# Patient Record
Sex: MALE | Race: WHITE | Employment: UNEMPLOYED | URBAN - METROPOLITAN AREA
[De-identification: names, ages, dates, MRNs, and addresses within clinical notes are randomized per-mention and may not be internally consistent; named-entity substitution may affect disease eponyms.]

---

## 2017-06-06 ENCOUNTER — OFFICE VISIT (OUTPATIENT)
Dept: FAMILY MEDICINE | Facility: CLINIC | Age: 22
End: 2017-06-06
Payer: COMMERCIAL

## 2017-06-06 VITALS
HEART RATE: 76 BPM | SYSTOLIC BLOOD PRESSURE: 122 MMHG | TEMPERATURE: 97.7 F | BODY MASS INDEX: 27.92 KG/M2 | DIASTOLIC BLOOD PRESSURE: 72 MMHG | WEIGHT: 195 LBS | HEIGHT: 70 IN

## 2017-06-06 DIAGNOSIS — Z00.00 ROUTINE HISTORY AND PHYSICAL EXAMINATION OF ADULT: Primary | ICD-10-CM

## 2017-06-06 LAB
ALBUMIN SERPL-MCNC: 4 G/DL (ref 3.4–5)
ALP SERPL-CCNC: 79 U/L (ref 40–150)
ALT SERPL W P-5'-P-CCNC: 27 U/L (ref 0–70)
ANION GAP SERPL CALCULATED.3IONS-SCNC: 6 MMOL/L (ref 3–14)
AST SERPL W P-5'-P-CCNC: 21 U/L (ref 0–45)
BASOPHILS # BLD AUTO: 0 10E9/L (ref 0–0.2)
BASOPHILS NFR BLD AUTO: 0.2 %
BILIRUB SERPL-MCNC: 0.5 MG/DL (ref 0.2–1.3)
BUN SERPL-MCNC: 11 MG/DL (ref 7–30)
CALCIUM SERPL-MCNC: 8.9 MG/DL (ref 8.5–10.1)
CHLORIDE SERPL-SCNC: 106 MMOL/L (ref 94–109)
CO2 SERPL-SCNC: 28 MMOL/L (ref 20–32)
CREAT SERPL-MCNC: 0.76 MG/DL (ref 0.66–1.25)
DIFFERENTIAL METHOD BLD: NORMAL
EOSINOPHIL # BLD AUTO: 0.2 10E9/L (ref 0–0.7)
EOSINOPHIL NFR BLD AUTO: 1.7 %
ERYTHROCYTE [DISTWIDTH] IN BLOOD BY AUTOMATED COUNT: 13.6 % (ref 10–15)
GFR SERPL CREATININE-BSD FRML MDRD: ABNORMAL ML/MIN/1.7M2
GLUCOSE SERPL-MCNC: 100 MG/DL (ref 70–99)
HCT VFR BLD AUTO: 46 % (ref 40–53)
HGB BLD-MCNC: 16.1 G/DL (ref 13.3–17.7)
LYMPHOCYTES # BLD AUTO: 1.6 10E9/L (ref 0.8–5.3)
LYMPHOCYTES NFR BLD AUTO: 16 %
MCH RBC QN AUTO: 27.9 PG (ref 26.5–33)
MCHC RBC AUTO-ENTMCNC: 35 G/DL (ref 31.5–36.5)
MCV RBC AUTO: 80 FL (ref 78–100)
MONOCYTES # BLD AUTO: 1 10E9/L (ref 0–1.3)
MONOCYTES NFR BLD AUTO: 9.7 %
NEUTROPHILS # BLD AUTO: 7.3 10E9/L (ref 1.6–8.3)
NEUTROPHILS NFR BLD AUTO: 72.4 %
PLATELET # BLD AUTO: 187 10E9/L (ref 150–450)
POTASSIUM SERPL-SCNC: 4.3 MMOL/L (ref 3.4–5.3)
PROT SERPL-MCNC: 7.8 G/DL (ref 6.8–8.8)
RBC # BLD AUTO: 5.77 10E12/L (ref 4.4–5.9)
SODIUM SERPL-SCNC: 140 MMOL/L (ref 133–144)
WBC # BLD AUTO: 10.1 10E9/L (ref 4–11)

## 2017-06-06 PROCEDURE — 80053 COMPREHEN METABOLIC PANEL: CPT | Performed by: PHYSICIAN ASSISTANT

## 2017-06-06 PROCEDURE — 36415 COLL VENOUS BLD VENIPUNCTURE: CPT | Performed by: PHYSICIAN ASSISTANT

## 2017-06-06 PROCEDURE — 85025 COMPLETE CBC W/AUTO DIFF WBC: CPT | Performed by: PHYSICIAN ASSISTANT

## 2017-06-06 PROCEDURE — 99395 PREV VISIT EST AGE 18-39: CPT | Performed by: PHYSICIAN ASSISTANT

## 2017-06-06 ASSESSMENT — ANXIETY QUESTIONNAIRES
2. NOT BEING ABLE TO STOP OR CONTROL WORRYING: NOT AT ALL
GAD7 TOTAL SCORE: 1
7. FEELING AFRAID AS IF SOMETHING AWFUL MIGHT HAPPEN: NOT AT ALL
5. BEING SO RESTLESS THAT IT IS HARD TO SIT STILL: SEVERAL DAYS
6. BECOMING EASILY ANNOYED OR IRRITABLE: NOT AT ALL
3. WORRYING TOO MUCH ABOUT DIFFERENT THINGS: NOT AT ALL
1. FEELING NERVOUS, ANXIOUS, OR ON EDGE: NOT AT ALL

## 2017-06-06 ASSESSMENT — PATIENT HEALTH QUESTIONNAIRE - PHQ9: 5. POOR APPETITE OR OVEREATING: NOT AT ALL

## 2017-06-06 NOTE — NURSING NOTE
"Chief Complaint   Patient presents with     Physical       Initial /72 (BP Location: Right arm, Patient Position: Chair, Cuff Size: Adult Large)  Pulse 76  Temp 97.7  F (36.5  C) (Tympanic)  Ht 5' 10\" (1.778 m)  Wt 195 lb (88.5 kg)  BMI 27.98 kg/m2 Estimated body mass index is 27.98 kg/(m^2) as calculated from the following:    Height as of this encounter: 5' 10\" (1.778 m).    Weight as of this encounter: 195 lb (88.5 kg).  Medication Reconciliation: complete    Health Maintenance that is potentially due pending provider review:  NONE    Edna GALDAMEZ MA        "

## 2017-06-06 NOTE — MR AVS SNAPSHOT
After Visit Summary   6/6/2017    James Webb    MRN: [id number]           Patient Information     Date Of Birth          1995        Visit Information        Provider Department      6/6/2017 11:20 AM Grady Ayala PA-C [facility]        Today's Diagnoses     Routine history and physical examination of adult    -  1      Care Instructions      Preventive Health Recommendations  Male Ages 18 - 25     Yearly exam:             See your health care provider every year in order to  o   Review health changes.   o   Discuss preventive care.    o   Review your medicines if your doctor has prescribed any.    You should be tested each year for STDs (sexually transmitted diseases).     Talk to your provider about cholesterol testing.      If you are at risk for diabetes, you should have a diabetes test (fasting glucose).    Shots: Get a flu shot each year. Get a tetanus shot every 10 years.     Nutrition:    Eat at least 5 servings of fruits and vegetables daily.     Eat whole-grain bread, whole-wheat pasta and brown rice instead of white grains and rice.     Talk to your provider about calcium and Vitamin D.     Lifestyle    Exercise for at least 150 minutes a week (30 minutes a day, 5 days a week). This will help you control your weight and prevent disease.     Limit alcohol to one drink per day.     No smoking.     Wear sunscreen to prevent skin cancer.     See your dentist every six months for an exam and cleaning.             Follow-ups after your visit        Who to contact     If you have questions or need follow up information about today's clinic visit or your schedule please contact [facility] directly at [phone].  Normal or non-critical lab and imaging results will be communicated to you by MyChart, letter or phone within 4 business days after the clinic has received the results. If you do not hear from us within 7 days, please contact the clinic  "through [employer] or phone. If you have a critical or abnormal lab result, we will notify you by phone as soon as possible.  Submit refill requests through [employer] or call your pharmacy and they will forward the refill request to us. Please allow 3 business days for your refill to be completed.          Additional Information About Your Visit        [employer] Information     [employer] lets you send messages to your doctor, view your test results, renew your prescriptions, schedule appointments and more. To sign up, go to [email or web address] . Click on \"Log in\" on the left side of the screen, which will take you to the Welcome page. Then click on \"Sign up Now\" on the right side of the page.     You will be asked to enter the access code listed below, as well as some personal information. Please follow the directions to create your username and password.     Your access code is: [email or web address]  Expires: 2017 11:40 AM     Your access code will  in 90 days. If you need help or a new code, please call your [facility] clinic or [phone].        Care EveryWhere ID     This is your Care EveryWhere ID. This could be used by other organizations to access your [facility] medical records  [id number]        Your Vitals Were     Pulse Temperature Height BMI (Body Mass Index)          76 97.7  F (36.5  C) (Tympanic) 5' 10\" (1.778 m) 27.98 kg/m2         Blood Pressure from Last 3 Encounters:   17 122/72   10/26/16 114/64   10/25/16 122/72    Weight from Last 3 Encounters:   17 195 lb (88.5 kg)   10/25/16 188 lb 11.4 oz (85.6 kg)   10/25/16 192 lb (87.1 kg)              Today, you had the following     No orders found for display         Today's Medication Changes          These changes are accurate as of: 17 11:40 AM.  If you have any questions, ask your nurse or doctor.               Stop taking these medicines if you haven't already. Please contact your care team if you have questions.     dexamethasone 4 " MG tablet   Commonly known as:  DECADRON   Stopped by:  Grady Ayala, JACQUELYN                    Primary Care Provider Office Phone # Fax #    Layla Webb [phone]       [place] [address] [address] [address]        Thank you!     Thank you for choosing [facility]  for your care. Our goal is always to provide you with excellent care. Hearing back from our patients is one way we can continue to improve our services. Please take a few minutes to complete the written survey that you may receive in the mail after your visit with us. Thank you!             Your Updated Medication List - Protect others around you: Learn how to safely use, store and throw away your medicines at www.disposemymeds.org.          This list is accurate as of: 6/6/17 11:40 AM.  Always use your most recent med list.                   Brand Name Dispense Instructions for use    albuterol 108 (90 BASE) MCG/ACT Inhaler    PROAIR HFA/PROVENTIL HFA/VENTOLIN HFA    1 Inhaler    Inhale 2 puffs into the lungs every 6 hours       nicotine 21 MG/24HR 24 hr patch    NICODERM CQ    30 patch    Place 1 patch onto the skin every 24 hours       nicotine polacrilex 2 MG gum    [employer] NICOTINE POLACRILEX    30 tablet    Place 1 each (2 mg) inside cheek as needed for smoking cessation

## 2017-06-06 NOTE — PROGRESS NOTES
SUBJECTIVE:     CC: James Webb is an 22 year old male who presents for preventative health visit.     Healthy Habits:    Do you get at least three servings of calcium containing foods daily (dairy, green leafy vegetables, etc.)? yes    Amount of exercise or daily activities, outside of work: 5-6 day(s) per week    Problems taking medications regularly No    Medication side effects: No    Have you had an eye exam in the past two years? no    Do you see a dentist twice per year? yes    Do you have sleep apnea, excessive snoring or daytime drowsiness?no            Today's PHQ-2 Score:   PHQ-2 ( 1999 Pfizer) 1/15/2015   Q1: Little interest or pleasure in doing things 0   Q2: Feeling down, depressed or hopeless 0   PHQ-2 Score 0       Abuse: Current or Past(Physical, Sexual or Emotional)- No  Do you feel safe in your environment - Yes    Social History   Substance Use Topics     Smoking status: Current Every Day Smoker     Packs/day: 0.50     Years: 5.00     Types: Cigarettes     Smokeless tobacco: Never Used      Comment: not exposed to 2nd hand smoke     Alcohol use No     The patient does not drink >3 drinks per day nor >7 drinks per week.    Last PSA: No results found for: PSA    No results for input(s): CHOL, HDL, LDL, TRIG, CHOLHDLRATIO, NHDL in the last 92797 hours.    Reviewed orders with patient. Reviewed health maintenance and updated orders accordingly - Yes    Reviewed and updated as needed this visit by clinical staff  Tobacco  Allergies  Med Hx  Surg Hx  Fam Hx  Soc Hx        Reviewed and updated as needed this visit by Provider        History reviewed. No pertinent past medical history.   Past Surgical History:   Procedure Laterality Date     SURGICAL HISTORY OF -   07/1999    PE Tubes       ROS:  C: NEGATIVE for fever, chills, change in weight  I: NEGATIVE for worrisome rashes, moles or lesions  E: NEGATIVE for vision changes or irritation  ENT: NEGATIVE for ear, mouth and throat  "problems  R: NEGATIVE for significant cough or SOB  B: NEGATIVE for masses, tenderness or discharge  CV: NEGATIVE for chest pain, palpitations or peripheral edema  GI: NEGATIVE for nausea, abdominal pain, heartburn, or change in bowel habits   male: negative for dysuria, hematuria, decreased urinary stream, erectile dysfunction, urethral discharge  M: NEGATIVE for significant arthralgias or myalgia  N: NEGATIVE for weakness, dizziness or paresthesias  P: NEGATIVE for changes in mood or affect    Problem list, Medication list, Allergies, and Medical/Social/Surgical histories reviewed in [facility] and updated as appropriate.  Labs reviewed in EPIC  BP Readings from Last 3 Encounters:   06/06/17 122/72   10/26/16 114/64   10/25/16 122/72    Wt Readings from Last 3 Encounters:   06/06/17 195 lb (88.5 kg)   10/25/16 188 lb 11.4 oz (85.6 kg)   10/25/16 192 lb (87.1 kg)                  Recent Labs   Lab Test  10/26/16   0610  10/25/16   1240  10/25/16   0925   ALT   --    --   31   CR  0.63*  0.70   --    GFRESTIMATED  >90  Non  GFR Calc    >90  Non  GFR Calc     --    GFRESTBLACK  >90   GFR Calc    >90   GFR Calc     --    POTASSIUM  4.6  3.9   --       OBJECTIVE:     /72 (BP Location: Right arm, Patient Position: Chair, Cuff Size: Adult Large)  Pulse 76  Temp 97.7  F (36.5  C) (Tympanic)  Ht 5' 10\" (1.778 m)  Wt 195 lb (88.5 kg)  BMI 27.98 kg/m2  EXAM:  GENERAL: healthy, alert and no distress  EYES: Eyes grossly normal to inspection, PERRL and conjunctivae and sclerae normal  HENT: ear canals and TM's normal, nose and mouth without ulcers or lesions  NECK: no adenopathy, no asymmetry, masses, or scars and thyroid normal to palpation  RESP: lungs clear to auscultation - no rales, rhonchi or wheezes  BREAST: normal without masses, tenderness or nipple discharge and no palpable axillary masses or adenopathy  CV: regular rate and rhythm, normal S1 S2, " "no S3 or S4, no murmur, click or rub, no peripheral edema and peripheral pulses strong  ABDOMEN: soft, nontender, no hepatosplenomegaly, no masses and bowel sounds normal  MS: no gross musculoskeletal defects noted, no edema  SKIN: no suspicious lesions or rashes  NEURO: Normal strength and tone, mentation intact and speech normal  PSYCH: mentation appears normal, affect normal/bright  LYMPH: no cervical, supraclavicular, axillary, or inguinal adenopathy    ASSESSMENT/PLAN:         ICD-10-CM    1. Routine history and physical examination of adult Z00.00 Comprehensive metabolic panel (BMP + Alb, Alk Phos, ALT, AST, Total. Bili, TP)     CBC with platelets and differential       COUNSELING:  Reviewed preventive health counseling, as reflected in patient instructions       Regular exercise       Healthy diet/nutrition         reports that he has been smoking Cigarettes.  He has a 2.50 pack-year smoking history. He has never used smokeless tobacco.    Estimated body mass index is 27.98 kg/(m^2) as calculated from the following:    Height as of this encounter: 5' 10\" (1.778 m).    Weight as of this encounter: 195 lb (88.5 kg).       Counseling Resources:  ATP IV Guidelines  Pooled Cohorts Equation Calculator  FRAX Risk Assessment  ICSI Preventive Guidelines  Dietary Guidelines for Americans, 2010  USDA's MyPlate  ASA Prophylaxis  Lung CA Screening    Grady Ayala PA-C  [facility]  "

## 2017-06-07 ASSESSMENT — PATIENT HEALTH QUESTIONNAIRE - PHQ9: SUM OF ALL RESPONSES TO PHQ QUESTIONS 1-9: 2

## 2017-06-07 ASSESSMENT — ANXIETY QUESTIONNAIRES: GAD7 TOTAL SCORE: 1

## 2017-06-07 ASSESSMENT — ASTHMA QUESTIONNAIRES: ACT_TOTALSCORE: 25

## 2017-12-20 ENCOUNTER — OFFICE VISIT (OUTPATIENT)
Dept: FAMILY MEDICINE | Facility: CLINIC | Age: 22
End: 2017-12-20
Payer: COMMERCIAL

## 2017-12-20 VITALS
WEIGHT: 180 LBS | TEMPERATURE: 98.8 F | HEIGHT: 70 IN | BODY MASS INDEX: 25.77 KG/M2 | DIASTOLIC BLOOD PRESSURE: 70 MMHG | SYSTOLIC BLOOD PRESSURE: 122 MMHG | HEART RATE: 90 BPM

## 2017-12-20 DIAGNOSIS — R07.0 THROAT PAIN: Primary | ICD-10-CM

## 2017-12-20 DIAGNOSIS — J02.0 STREP THROAT: ICD-10-CM

## 2017-12-20 LAB
DEPRECATED S PYO AG THROAT QL EIA: ABNORMAL
SPECIMEN SOURCE: ABNORMAL

## 2017-12-20 PROCEDURE — 87880 STREP A ASSAY W/OPTIC: CPT | Performed by: PHYSICIAN ASSISTANT

## 2017-12-20 PROCEDURE — 99213 OFFICE O/P EST LOW 20 MIN: CPT | Performed by: PHYSICIAN ASSISTANT

## 2017-12-20 ASSESSMENT — ENCOUNTER SYMPTOMS
EYE PAIN: 0
DEPRESSION: 0
WEAKNESS: 1
DYSURIA: 0
NAUSEA: 0
SORE THROAT: 1
HEADACHES: 1
COUGH: 1
FREQUENCY: 0
DIARRHEA: 0
FEVER: 1
BACK PAIN: 0
BLURRED VISION: 0
CHILLS: 1
CONSTIPATION: 0
ABDOMINAL PAIN: 0
PALPITATIONS: 0
DIZZINESS: 0

## 2017-12-20 NOTE — LETTER
My Asthma Action Plan  Name: James Webb   YOB: 1995  Date: 12/20/2017   My doctor: Grady Ayala PA-C   My clinic: [facility]        My Control Medicine: None  My Rescue Medicine: Albuterol (Proair/Ventolin/Proventil) inhaler .   My Asthma Severity: intermittent  Avoid your asthma triggers: Patient is unaware of triggers               GREEN ZONE   Good Control    I feel good    No cough or wheeze    Can work, sleep and play without asthma symptoms       Take your asthma control medicine every day.     1. If exercise triggers your asthma, take your rescue medication    15 minutes before exercise or sports, and    During exercise if you have asthma symptoms  2. Spacer to use with inhaler: If you have a spacer, make sure to use it with your inhaler             YELLOW ZONE Getting Worse  I have ANY of these:    I do not feel good    Cough or wheeze    Chest feels tight    Wake up at night   1. Keep taking your Green Zone medications  2. Start taking your rescue medicine:    every 20 minutes for up to 1 hour. Then every 4 hours for 24-48 hours.  3. If you stay in the Yellow Zone for more than 12-24 hours, contact your doctor.  4. If you do not return to the Green Zone in 12-24 hours or you get worse, start taking your oral steroid medicine if prescribed by your provider.           RED ZONE Medical Alert - Get Help  I have ANY of these:    I feel awful    Medicine is not helping    Breathing getting harder    Trouble walking or talking    Nose opens wide to breathe       1. Take your rescue medicine NOW  2. If your provider has prescribed an oral steroid medicine, start taking it NOW  3. Call your doctor NOW  4. If you are still in the Red Zone after 20 minutes and you have not reached your doctor:    Take your rescue medicine again and    Call 911 or go to the emergency room right away    See your regular doctor within 2 weeks of an Emergency Room or Urgent Care visit for  follow-up treatment.        Electronically signed by: Merry Chavez, December 20, 2017    Annual Reminders:  Meet with Asthma Educator,  Flu Shot in the Fall, consider Pneumonia Vaccination for patients with asthma (aged 19 and older).    Pharmacy:    [facility] [address] [facility] [address] [facility] [address] [facility] [address] [facility] [address] [facility] [address]                    Asthma Triggers  How To Control Things That Make Your Asthma Worse    Triggers are things that make your asthma worse.  Look at the list below to help you find your triggers and what you can do about them.  You can help prevent asthma flare-ups by staying away from your triggers.      Trigger                                                          What you can do   Cigarette Smoke  Tobacco smoke can make asthma worse. Do not allow smoking in your home, car or around you.  Be sure no one smokes at a child s day care or school.  If you smoke, ask your health care provider for ways to help you quit.  Ask family members to quit too.  Ask your health care provider for a referral to Quit Plan to help you quit smoking, or call 7-009-141-PLAN.     Colds, Flu, Bronchitis  These are common triggers of asthma. Wash your hands often.  Don t touch your eyes, nose or mouth.  Get a flu shot every year.     Dust Mites  These are tiny bugs that live in cloth or carpet. They are too small to see. Wash sheets and blankets in hot water every week.   Encase pillows and mattress in dust mite proof covers.  Avoid having carpet if you can. If you have carpet, vacuum weekly.   Use a dust mask and HEPA vacuum.   Pollen and Outdoor Mold  Some people are allergic to trees, grass, or weed pollen, or molds. Try to keep your windows closed.  Limit time out doors when pollen count is high.   Ask you health care provider about taking medicine during allergy season.     Animal Dander  Some people are allergic to skin flakes,  urine or saliva from pets with fur or feathers. Keep pets with fur or feathers out of your home.    If you can t keep the pet outdoors, then keep the pet out of your bedroom.  Keep the bedroom door closed.  Keep pets off cloth furniture and away from stuffed toys.     Mice, Rats, and Cockroaches  Some people are allergic to the waste from these pests.   Cover food and garbage.  Clean up spills and food crumbs.  Store grease in the refrigerator.   Keep food out of the bedroom.   Indoor Mold  This can be a trigger if your home has high moisture. Fix leaking faucets, pipes, or other sources of water.   Clean moldy surfaces.  Dehumidify basement if it is damp and smelly.   Smoke, Strong Odors, and Sprays  These can reduce air quality. Stay away from strong odors and sprays, such as perfume, powder, hair spray, paints, smoke incense, paint, cleaning products, candles and new carpet.   Exercise or Sports  Some people with asthma have this trigger. Be active!  Ask your doctor about taking medicine before sports or exercise to prevent symptoms.    Warm up for 5-10 minutes before and after sports or exercise.     Other Triggers of Asthma  Cold air:  Cover your nose and mouth with a scarf.  Sometimes laughing or crying can be a trigger.  Some medicines and food can trigger asthma.

## 2017-12-20 NOTE — PROGRESS NOTES
HPI      SUBJECTIVE:   James Webb is a 22 year old male who presents to clinic today for sore throat and fever for the last 3-4 days.    ENT Symptoms             Symptoms: cc Present Absent Comment   Fever/Chills  x     Fatigue  x     Muscle Aches   x    Eye Irritation   x    Sneezing   x    Nasal Jose/Drg   x    Sinus Pressure/Pain   x    Loss of smell   x    Dental pain   x    Sore Throat  x     Swollen Glands  x     Ear Pain/Fullness   x    Cough  x     Wheeze  x     Chest Pain   x    Shortness of breath  x     Rash   x    Other   x      Symptom duration:  3-4 days   Symptom severity:  moderate   Treatments tried:  none   Contacts:  None           Problem list and histories reviewed & adjusted, as indicated.  Additional history: as documented    Patient Active Problem List   Diagnosis     Attention deficit hyperactivity disorder (ADHD)     Depression/Anxiety     Mild intermittent asthma     Infectious mononucleosis     Mononucleosis     Past Surgical History:   Procedure Laterality Date     SURGICAL HISTORY OF -   07/1999    PE Tubes       Social History   Substance Use Topics     Smoking status: Current Every Day Smoker     Packs/day: 0.50     Years: 5.00     Types: Cigarettes     Smokeless tobacco: Never Used      Comment: not exposed to 2nd hand smoke     Alcohol use No     Family History   Problem Relation Age of Onset     Hypertension Maternal Grandmother      Cancer - colorectal Other      colon cancer      Respiratory Maternal Aunt      Thyroid Disease Maternal Aunt          Current Outpatient Prescriptions   Medication Sig Dispense Refill     amoxicillin-clavulanate (AUGMENTIN) 875-125 MG per tablet Take 1 tablet by mouth 2 times daily 20 tablet 0     albuterol (PROAIR HFA, PROVENTIL HFA, VENTOLIN HFA) 108 (90 BASE) MCG/ACT inhaler Inhale 2 puffs into the lungs every 6 hours 1 Inhaler 5     No Known Allergies  Labs reviewed in EPIC      Reviewed and updated as needed this visit by clinical  staff       Reviewed and updated as needed this visit by Provider     Review of Systems   Constitutional: Positive for chills, fever and malaise/fatigue.   HENT: Positive for sore throat. Negative for congestion, ear pain, hearing loss and nosebleeds.    Eyes: Negative for blurred vision and pain.   Respiratory: Positive for cough.    Cardiovascular: Negative for chest pain and palpitations.   Gastrointestinal: Negative for abdominal pain, constipation, diarrhea and nausea.   Genitourinary: Negative for dysuria and frequency.   Musculoskeletal: Negative for back pain and joint pain.   Skin: Negative for rash.   Neurological: Positive for weakness and headaches. Negative for dizziness.   Psychiatric/Behavioral: Negative for depression.         Physical Exam   Constitutional: He is oriented to person, place, and time and well-developed, well-nourished, and in no distress.   HENT:   Head: Normocephalic and atraumatic.   Right Ear: External ear normal.   Left Ear: External ear normal.   Nose: Nose normal.   Mouth/Throat: Oropharyngeal exudate, posterior oropharyngeal edema and posterior oropharyngeal erythema present.   Eyes: Conjunctivae and EOM are normal. Pupils are equal, round, and reactive to light. Right eye exhibits no discharge. Left eye exhibits no discharge. No scleral icterus.   Neck: Normal range of motion. Neck supple. No thyromegaly present.   Cardiovascular: Normal rate, regular rhythm, normal heart sounds and intact distal pulses.  Exam reveals no gallop and no friction rub.    No murmur heard.  Pulmonary/Chest: Effort normal and breath sounds normal. No respiratory distress. He has no wheezes. He has no rales. He exhibits no tenderness.   Abdominal: Soft. Bowel sounds are normal. He exhibits no distension and no mass. There is no tenderness. There is no rebound and no guarding.   Musculoskeletal: Normal range of motion. He exhibits no edema or tenderness.   Lymphadenopathy:     He has no cervical  adenopathy.   Neurological: He is alert and oriented to person, place, and time. He has normal reflexes. No cranial nerve deficit. He exhibits normal muscle tone. Gait normal. Coordination normal.   Skin: Skin is warm and dry. No rash noted. No erythema.   Psychiatric: Mood, memory, affect and judgment normal.       (R07.0) Throat pain  (primary encounter diagnosis)  Comment:   Plan: Rapid strep screen           (J02.0) Strep throat  Comment:   Plan: amoxicillin-clavulanate (AUGMENTIN) 875-125 MG         per tablet, DISCONTINUED:         amoxicillin-clavulanate (AUGMENTIN) 875-125 MG         per tablet          Strep screen was positive and because of the enlarged tonsils and exudative nature I have used Augmentin and follow-up if not improving

## 2017-12-20 NOTE — MR AVS SNAPSHOT
"              After Visit Summary   12/20/2017    James Webb    MRN: [id number]           Patient Information     Date Of Birth          1995        Visit Information        Provider Department      12/20/2017 10:40 AM Grady Ayala PA-C [facility]        Today's Diagnoses     Throat pain    -  1    Strep throat           Follow-ups after your visit        Follow-up notes from your care team     Return if symptoms worsen or fail to improve.      Who to contact     If you have questions or need follow up information about today's clinic visit or your schedule please contact [facility] directly at [phone].  Normal or non-critical lab and imaging results will be communicated to you by [employer], letter or phone within 4 business days after the clinic has received the results. If you do not hear from us within 7 days, please contact the clinic through [employer] or phone. If you have a critical or abnormal lab result, we will notify you by phone as soon as possible.  Submit refill requests through [employer] or call your pharmacy and they will forward the refill request to us. Please allow 3 business days for your refill to be completed.          Additional Information About Your Visit        MyChart Information     [employer] gives you secure access to your electronic health record. If you see a primary care provider, you can also send messages to your care team and make appointments. If you have questions, please call your primary care clinic.  If you do not have a primary care provider, please call [phone] and they will assist you.        Care EveryWhere ID     This is your Care EveryWhere ID. This could be used by other organizations to access your [place] medical records  [id number]        Your Vitals Were     Pulse Temperature Height BMI (Body Mass Index)          90 98.8  F (37.1  C) (Oral) 5' 10\" (1.778 m) 25.83 kg/m2         Blood Pressure from Last 3 " Encounters:   12/20/17 122/70   06/06/17 122/72   10/26/16 114/64    Weight from Last 3 Encounters:   12/20/17 180 lb (81.6 kg)   06/06/17 195 lb (88.5 kg)   10/25/16 188 lb 11.4 oz (85.6 kg)              We Performed the Following     Asthma Action Plan (AAP)     Rapid strep screen          Today's Medication Changes          These changes are accurate as of: 12/20/17  1:27 PM.  If you have any questions, ask your nurse or doctor.               Start taking these medicines.        Dose/Directions    amoxicillin-clavulanate 875-125 MG per tablet   Commonly known as:  AUGMENTIN   Used for:  Strep throat   Started by:  Grady Ayala PA-C        Dose:  1 tablet   Take 1 tablet by mouth 2 times daily   Quantity:  20 tablet   Refills:  0            Where to get your medicines      These medications were sent to [facility] [address] [place] [address] [address] [facility]  [address] [facility] [address]     Phone:  [phone]     amoxicillin-clavulanate 875-125 MG per tablet                Primary Care Provider Office Phone # Fax #    Layla Webb, [phone]       [address] [place] [address]        Equal Access to Services     KAUSHIK WEST AH: Hadii lynda de los santos hadasho Soomaali, waaxda luqadaha, qaybta kaalmada adeegyada, ericka jones. So [facility] [phone].    ATENCIÓN: Si habla español, tiene a douglas disposición servicios gratuitos de asistencia lingüística. Llame al [phone].    We comply with applicable federal civil rights laws and [place] laws. We do not discriminate on the basis of race, color, national origin, age, disability, sex, sexual orientation, or gender identity.            Thank you!     Thank you for choosing [facility]  for your care. Our goal is always to provide you with excellent care. Hearing back from our patients is one way we can continue to improve our services. Please take a few minutes to complete the  written survey that you may receive in the mail after your visit with us. Thank you!             Your Updated Medication List - Protect others around you: Learn how to safely use, store and throw away your medicines at www.disposemymeds.org.          This list is accurate as of: 12/20/17  1:28 PM.  Always use your most recent med list.                   Brand Name Dispense Instructions for use Diagnosis    albuterol 108 (90 BASE) MCG/ACT Inhaler    PROAIR HFA/PROVENTIL HFA/VENTOLIN HFA    1 Inhaler    Inhale 2 puffs into the lungs every 6 hours    Mild intermittent asthma       amoxicillin-clavulanate 875-125 MG per tablet    AUGMENTIN    20 tablet    Take 1 tablet by mouth 2 times daily    Strep throat

## 2017-12-21 ASSESSMENT — ASTHMA QUESTIONNAIRES: ACT_TOTALSCORE: 24

## 2018-03-06 DIAGNOSIS — F40.243 FEAR OF FLYING: Primary | ICD-10-CM

## 2018-03-06 NOTE — TELEPHONE ENCOUNTER
James's mom was here to see another provider and asked if we could ask you if James can get an Rx for ativan for fear of flying? They are going on a vacation together. This is okay to wait for Seth on 3/8/18

## 2018-03-12 RX ORDER — LORAZEPAM 0.5 MG/1
0.5 TABLET ORAL EVERY 8 HOURS PRN
Qty: 20 TABLET | Refills: 0 | Status: SHIPPED | OUTPATIENT
Start: 2018-03-12 | End: 2019-12-09

## 2018-08-07 ENCOUNTER — OFFICE VISIT (OUTPATIENT)
Dept: FAMILY MEDICINE | Facility: CLINIC | Age: 23
End: 2018-08-07
Payer: COMMERCIAL

## 2018-08-07 VITALS
RESPIRATION RATE: 18 BRPM | BODY MASS INDEX: 28.07 KG/M2 | OXYGEN SATURATION: 96 % | TEMPERATURE: 99.2 F | WEIGHT: 185.2 LBS | HEART RATE: 98 BPM | HEIGHT: 68 IN

## 2018-08-07 DIAGNOSIS — J02.0 STREP THROAT: Primary | ICD-10-CM

## 2018-08-07 DIAGNOSIS — J45.20 MILD INTERMITTENT ASTHMA WITHOUT COMPLICATION: ICD-10-CM

## 2018-08-07 DIAGNOSIS — R07.0 THROAT PAIN: ICD-10-CM

## 2018-08-07 LAB
DEPRECATED S PYO AG THROAT QL EIA: NORMAL
SPECIMEN SOURCE: NORMAL

## 2018-08-07 PROCEDURE — 87880 STREP A ASSAY W/OPTIC: CPT | Performed by: NURSE PRACTITIONER

## 2018-08-07 PROCEDURE — 87081 CULTURE SCREEN ONLY: CPT | Performed by: NURSE PRACTITIONER

## 2018-08-07 PROCEDURE — 99213 OFFICE O/P EST LOW 20 MIN: CPT | Performed by: NURSE PRACTITIONER

## 2018-08-07 RX ORDER — ALBUTEROL SULFATE 90 UG/1
2 AEROSOL, METERED RESPIRATORY (INHALATION) EVERY 6 HOURS
Qty: 1 INHALER | Refills: 5 | Status: SHIPPED | OUTPATIENT
Start: 2018-08-07 | End: 2021-12-14

## 2018-08-07 RX ORDER — PREDNISONE 20 MG/1
60 TABLET ORAL ONCE
Qty: 3 TABLET | Refills: 0 | Status: SHIPPED | OUTPATIENT
Start: 2018-08-07 | End: 2018-08-07

## 2018-08-07 RX ORDER — PENICILLIN V POTASSIUM 500 MG/1
500 TABLET, FILM COATED ORAL 2 TIMES DAILY
Qty: 20 TABLET | Refills: 0 | Status: SHIPPED | OUTPATIENT
Start: 2018-08-07 | End: 2018-10-15

## 2018-08-07 ASSESSMENT — PAIN SCALES - GENERAL: PAINLEVEL: SEVERE PAIN (7)

## 2018-08-07 NOTE — NURSING NOTE
"Chief Complaint   Patient presents with     Pharyngitis       Initial There were no vitals taken for this visit. Estimated body mass index is 25.83 kg/(m^2) as calculated from the following:    Height as of 12/20/17: 5' 10\" (1.778 m).    Weight as of 12/20/17: 180 lb (81.6 kg).      Health Maintenance that is potentially due pending provider review:  ACT    Possibly completing today per provider review.        "

## 2018-08-07 NOTE — PATIENT INSTRUCTIONS
1.  Take Prednisone when you get home and not before bed, it will keep you up.  2.  Take antibiotic as directed for 10 days.  3.  Albuterol refilled today.  4.  Follow-up in clinic if any worsening or persistent symptoms despite treatment.  Self-Care for Sore Throats    Sore throats happen for many reasons, such as colds, allergies, and infections caused by viruses or bacteria. In any case, your throat becomes red and sore. Your goal for self-care is to reduce your discomfort while giving your throat a chance to heal.  Moisten and soothe your throat  Tips include the following:    Try a sip of water first thing after waking up.    Keep your throat moist by drinking 6 or more glasses of clear liquids every day.    Run a cool-air humidifier in your room overnight.    Avoid cigarette smoke.     Suck on throat lozenges, cough drops, hard candy, ice chips, or frozen fruit-juice bars. Use the sugar-free versions if your diet or medical condition requires them.  Gargle to ease irritation  Gargling every hour or 2 can ease irritation. Try gargling with 1 of these solutions:    1/4 teaspoon of salt in 1/2 cup of warm water    An over-the-counter anesthetic gargle  Use medicine for more relief  Over-the-counter medicine can reduce sore throat symptoms. Ask your pharmacist if you have questions about which medicine to use:    Ease pain with anesthetic sprays. Aspirin or an aspirin substitute also helps. Remember, never give aspirin to anyone 18 or younger, or if you are already taking blood thinners.     For sore throats caused by allergies, try antihistamines to block the allergic reaction.    Remember: unless a sore throat is caused by a bacterial infection, antibiotics won t help you.  Prevent future sore throats  Prevention tips include the following:    Stop smoking or reduce contact with secondhand smoke. Smoke irritates the tender throat lining.    Limit contact with pets and with allergy-causing substances, such as  pollen and mold.    When you re around someone with a sore throat or cold, wash your hands often to keep viruses or bacteria from spreading.    Don t strain your vocal cords.  Call your healthcare provider  Contact your healthcare provider if you have:    A temperature over 101 F (38.3 C)    White spots on the throat    Great difficulty swallowing    Trouble breathing    A skin rash    Recent exposure to someone else with strep bacteria    Severe hoarseness and swollen glands in the neck or jaw   Date Last Reviewed: 8/1/2016 2000-2017 [employer]. [address] [place] [address]. All rights reserved. This information is not intended as a substitute for professional medical care. Always follow your healthcare professional's instructions.

## 2018-08-07 NOTE — PROGRESS NOTES
SUBJECTIVE:   James Webb is a 23 year old male who presents to clinic today for the following health issues:    Here with mother  ENT Symptoms             Symptoms: cc Present Absent Comment   Fever/Chills  x  chills   Fatigue  x     Muscle Aches   x    Eye Irritation  x  Watering when laying down   Sneezing  x     Nasal Jose/Drg  x     Sinus Pressure/Pain  x     Loss of smell  x     Dental pain   x    Sore Throat  x     Swollen Glands  x     Ear Pain/Fullness       Cough  x  Mainly at night but all day long per mom   Wheeze  x     Chest Pain   x    Shortness of breath  x  With coughing   Rash   x    Other  x  nausea     Symptom duration 4 days  :  Answers for HPI/ROS submitted by the patient on 8/7/2018   Chronic problems general questions HPI Form  Cough:: YES  Wheezing:: YES  Dyspnea:: YES  Use of rescue inhaler:: None  Taking Asthma medication as prescribed:: NO  Asthma triggers:: Dust mites, Humidity, Pollens, Strong odors and fumes  ER/UC Visits or Admissions:: None     Symptom severity:  moderate   Treatments tried:  0   Contacts:  exposure to mono     Acid in throat, wishes he could throw up.    Problem list and histories reviewed & adjusted, as indicated.  Additional history: as documented    Patient Active Problem List   Diagnosis     Attention deficit hyperactivity disorder (ADHD)     Depression/Anxiety     Mild intermittent asthma     Infectious mononucleosis     Mononucleosis     Past Surgical History:   Procedure Laterality Date     SURGICAL HISTORY OF -   07/1999    PE Tubes       Social History   Substance Use Topics     Smoking status: Current Every Day Smoker     Packs/day: 0.50     Years: 5.00     Types: Cigarettes     Smokeless tobacco: Never Used     Alcohol use No     Family History   Problem Relation Age of Onset     Hypertension Maternal Grandmother      Cancer - colorectal Other      colon cancer      Respiratory Maternal Aunt      Thyroid Disease Maternal Aunt          Current  "Outpatient Prescriptions   Medication Sig Dispense Refill     albuterol (PROAIR HFA/PROVENTIL HFA/VENTOLIN HFA) 108 (90 Base) MCG/ACT Inhaler Inhale 2 puffs into the lungs every 6 hours 1 Inhaler 5     penicillin V potassium (VEETID) 500 MG tablet Take 1 tablet (500 mg) by mouth 2 times daily 20 tablet 0     predniSONE (DELTASONE) 20 MG tablet Take 3 tablets (60 mg) by mouth once for 1 dose 3 tablet 0     LORazepam (ATIVAN) 0.5 MG tablet Take 1 tablet (0.5 mg) by mouth every 8 hours as needed for anxiety (Patient not taking: Reported on 8/7/2018) 20 tablet 0     [DISCONTINUED] albuterol (PROAIR HFA, PROVENTIL HFA, VENTOLIN HFA) 108 (90 BASE) MCG/ACT inhaler Inhale 2 puffs into the lungs every 6 hours (Patient not taking: Reported on 8/7/2018) 1 Inhaler 5     No Known Allergies    Reviewed and updated as needed this visit by clinical staff  Tobacco  Allergies  Meds  Problems  Soc Hx      Reviewed and updated as needed this visit by Provider  Allergies  Meds  Problems         ROS:  CONSTITUTIONAL:POSITIVE  for arthralgias, chills, fever and sweats  INTEGUMENTARY/SKIN: NEGATIVE for worrisome rashes, moles or lesions  ENT/MOUTH: NEGATIVE for ear, mouth and throat problems  RESP: NEGATIVE for significant cough or SOB  CV: NEGATIVE for chest pain, palpitations or peripheral edema  GI: POSITIVE for diarrhea, nausea and poor appetite  MUSCULOSKELETAL: POSITIVE  for body aches  PSYCHIATRIC: NEGATIVE for changes in mood or affect  ROS otherwise negative    OBJECTIVE:     BP (P) 114/62  Pulse 98  Temp 99.2  F (37.3  C)  Resp 18  Ht 5' 7.75\" (1.721 m)  Wt 185 lb 3.2 oz (84 kg)  SpO2 96%  BMI 28.37 kg/m2  Body mass index is 28.37 kg/(m^2).  GENERAL: healthy, alert and no distress  HENT: normal cephalic/atraumatic, right ear: occluded with wax, left ear: perforation in TM with no signs or symptoms of infection or drainage, oral mucous membranes moist, tonsillar hypertrophy, tonsillar erythema and tonsillar " exudate  NECK: bilateral anterior cervical adenopathy  RESP: lungs clear to auscultation - no rales, rhonchi or wheezes  CV: regular rate and rhythm, normal S1 S2, no S3 or S4, no murmur, click or rub, no peripheral edema and peripheral pulses strong  ABDOMEN: soft, nontender, no hepatosplenomegaly, no masses and bowel sounds normal  MS: no gross musculoskeletal defects noted, no edema  SKIN: no suspicious lesions or rashes  PSYCH: mentation appears normal, affect normal/bright  LYMPH: anterior cervical: enlarged tender nodes; posterior cervical: no adenopathy; Patient has posterior auricular node enlargement behind the right ear with no tenderness, redness or warmth.    Diagnostic Test Results:  Results for orders placed or performed in visit on 08/07/18 (from the past 24 hour(s))   Strep, Rapid Screen   Result Value Ref Range    Specimen Description Throat     Rapid Strep A Screen       NEGATIVE: No Group A streptococcal antigen detected by immunoassay, await culture report.       ASSESSMENT/PLAN:     1. Strep throat  Rapid strep is negative but culture is pending.  Patient meets empirical criteria for treatment with fevers, enlarged and tender cervical lymph nodes and tonsillar exudate.  I am starting him on Pen VK for 10 days of treatment with one dose of 60mg prednisone for throat swelling.  Information given on sore throat management.  Discussed use of Mucinex and flonase for sinus symptoms.  With allergies may want to try Zyrtec once daily over the counter.  I will call with culture results and to check in on patient tomorrow.  Follow-up if persistent symptoms or worsening symptoms despite treatment.  - penicillin V potassium (VEETID) 500 MG tablet; Take 1 tablet (500 mg) by mouth 2 times daily  Dispense: 20 tablet; Refill: 0  - predniSONE (DELTASONE) 20 MG tablet; Take 3 tablets (60 mg) by mouth once for 1 dose  Dispense: 3 tablet; Refill: 0    2. Throat pain  - Strep, Rapid Screen  - Beta strep group A  culture  - predniSONE (DELTASONE) 20 MG tablet; Take 3 tablets (60 mg) by mouth once for 1 dose  Dispense: 3 tablet; Refill: 0    3. Mild intermittent asthma without complication  Refilled albuterol since he is currently out.  ACT completed and normal.  - albuterol (PROAIR HFA/PROVENTIL HFA/VENTOLIN HFA) 108 (90 Base) MCG/ACT Inhaler; Inhale 2 puffs into the lungs every 6 hours  Dispense: 1 Inhaler; Refill: 5    See Patient Instructions    Marisol Harris NP  [facility]

## 2018-08-07 NOTE — MR AVS SNAPSHOT
After Visit Summary   8/7/2018    James Webb    MRN: [id number]           Patient Information     Date Of Birth          1995        Visit Information        Provider Department      8/7/2018 10:40 AM Marisol Harris NP [facility]        Today's Diagnoses     Strep throat    -  1    Throat pain        Mild intermittent asthma without complication          Care Instructions    1.  Take Prednisone when you get home and not before bed, it will keep you up.  2.  Take antibiotic as directed for 10 days.  3.  Albuterol refilled today.  4.  Follow-up in clinic if any worsening or persistent symptoms despite treatment.  Self-Care for Sore Throats    Sore throats happen for many reasons, such as colds, allergies, and infections caused by viruses or bacteria. In any case, your throat becomes red and sore. Your goal for self-care is to reduce your discomfort while giving your throat a chance to heal.  Moisten and soothe your throat  Tips include the following:    Try a sip of water first thing after waking up.    Keep your throat moist by drinking 6 or more glasses of clear liquids every day.    Run a cool-air humidifier in your room overnight.    Avoid cigarette smoke.     Suck on throat lozenges, cough drops, hard candy, ice chips, or frozen fruit-juice bars. Use the sugar-free versions if your diet or medical condition requires them.  Gargle to ease irritation  Gargling every hour or 2 can ease irritation. Try gargling with 1 of these solutions:    1/4 teaspoon of salt in 1/2 cup of warm water    An over-the-counter anesthetic gargle  Use medicine for more relief  Over-the-counter medicine can reduce sore throat symptoms. Ask your pharmacist if you have questions about which medicine to use:    Ease pain with anesthetic sprays. Aspirin or an aspirin substitute also helps. Remember, never give aspirin to anyone 18 or younger, or if you are already taking blood  thinners.     For sore throats caused by allergies, try antihistamines to block the allergic reaction.    Remember: unless a sore throat is caused by a bacterial infection, antibiotics won t help you.  Prevent future sore throats  Prevention tips include the following:    Stop smoking or reduce contact with secondhand smoke. Smoke irritates the tender throat lining.    Limit contact with pets and with allergy-causing substances, such as pollen and mold.    When you re around someone with a sore throat or cold, wash your hands often to keep viruses or bacteria from spreading.    Don t strain your vocal cords.  Call your healthcare provider  Contact your healthcare provider if you have:    A temperature over 101 F (38.3 C)    White spots on the throat    Great difficulty swallowing    Trouble breathing    A skin rash    Recent exposure to someone else with strep bacteria    Severe hoarseness and swollen glands in the neck or jaw   Date Last Reviewed: 8/1/2016 2000-2017 [employer]. [address] [place] [address]. All rights reserved. This information is not intended as a substitute for professional medical care. Always follow your healthcare professional's instructions.                Follow-ups after your visit        Who to contact     If you have questions or need follow up information about today's clinic visit or your schedule please contact [facility] directly at [phone].  Normal or non-critical lab and imaging results will be communicated to you by MyChart, letter or phone within 4 business days after the clinic has received the results. If you do not hear from us within 7 days, please contact the clinic through MyChart or phone. If you have a critical or abnormal lab result, we will notify you by phone as soon as possible.  Submit refill requests through [employer] [place] or call your pharmacy and they will forward the refill request to us. Please allow 3 business days  "for your refill to be completed.          Additional Information About Your Visit        UBIK[employer]hart Information     [employer] gives you secure access to your electronic health record. If you see a primary care provider, you can also send messages to your care team and make appointments. If you have questions, please call your primary care clinic.  If you do not have a primary care provider, please call [phone] and they will assist you.        Care EveryWhere ID     This is your Care EveryWhere ID. This could be used by other organizations to access your [place] medical records  [id number]        Your Vitals Were     Pulse Temperature Respirations Height Pulse Oximetry BMI (Body Mass Index)    98 99.2  F (37.3  C) 18 5' 7.75\" (1.721 m) 96% 28.37 kg/m2       Blood Pressure from Last 3 Encounters:   08/07/18 (P) 114/62   12/20/17 122/70   06/06/17 122/72    Weight from Last 3 Encounters:   08/07/18 185 lb 3.2 oz (84 kg)   12/20/17 180 lb (81.6 kg)   06/06/17 195 lb (88.5 kg)              We Performed the Following     Beta strep group A culture     Strep, Rapid Screen          Today's Medication Changes          These changes are accurate as of 8/7/18 11:23 AM.  If you have any questions, ask your nurse or doctor.               Start taking these medicines.        Dose/Directions    penicillin V potassium 500 MG tablet   Commonly known as:  VEETID   Used for:  Strep throat   Started by:  Marisol Harris NP        Dose:  500 mg   Take 1 tablet (500 mg) by mouth 2 times daily   Quantity:  20 tablet   Refills:  0       predniSONE 20 MG tablet   Commonly known as:  DELTASONE   Used for:  Strep throat, Throat pain   Started by:  Marislo Harris NP        Dose:  60 mg   Take 3 tablets (60 mg) by mouth once for 1 dose   Quantity:  3 tablet   Refills:  0            Where to get your medicines      These medications were sent to [facility] [address] [place], [place] - [address]   [address], [facility] [address]     Phone:  [phone]     albuterol 108 (90 Base) MCG/ACT Inhaler    penicillin V potassium 500 MG tablet    predniSONE 20 MG tablet                Primary Care Provider Office Phone # Fax #    Grady Ayala PA-C [phone] [address] [facility] [address]        Equal Access to Services     [facility] : Hadii aad ku hadasho Soomaali, waaxda luqadaha, qaybta kaalmada adeegyada, waxay idiin hayaan adeeg khjanett laamrita . So [facility] [phone].    ATENCIÓN: Si habla español, tiene a douglas disposición servicios gratuitos de asistencia lingüística. Ermelinda al [phone].    We comply with applicable federal civil rights laws and [place] laws. We do not discriminate on the basis of race, color, national origin, age, disability, sex, sexual orientation, or gender identity.            Thank you!     Thank you for choosing [facility]  for your care. Our goal is always to provide you with excellent care. Hearing back from our patients is one way we can continue to improve our services. Please take a few minutes to complete the written survey that you may receive in the mail after your visit with us. Thank you!             Your Updated Medication List - Protect others around you: Learn how to safely use, store and throw away your medicines at www.disposemymeds.org.          This list is accurate as of 8/7/18 11:23 AM.  Always use your most recent med list.                   Brand Name Dispense Instructions for use Diagnosis    albuterol 108 (90 Base) MCG/ACT Inhaler    PROAIR HFA/PROVENTIL HFA/VENTOLIN HFA    1 Inhaler    Inhale 2 puffs into the lungs every 6 hours    Mild intermittent asthma without complication       LORazepam 0.5 MG tablet    ATIVAN    20 tablet    Take 1 tablet (0.5 mg) by mouth every 8 hours as needed for anxiety    Fear of flying       penicillin V potassium 500 MG tablet    VEETID    20 tablet    Take 1 tablet (500 mg) by mouth 2  times daily    Strep throat       predniSONE 20 MG tablet    DELTASONE    3 tablet    Take 3 tablets (60 mg) by mouth once for 1 dose    Strep throat, Throat pain

## 2018-08-08 LAB
BACTERIA SPEC CULT: NORMAL
SPECIMEN SOURCE: NORMAL

## 2018-08-08 ASSESSMENT — ASTHMA QUESTIONNAIRES: ACT_TOTALSCORE: 22

## 2018-08-09 ENCOUNTER — APPOINTMENT (OUTPATIENT)
Dept: GENERAL RADIOLOGY | Facility: CLINIC | Age: 23
End: 2018-08-09
Attending: PHYSICIAN ASSISTANT
Payer: COMMERCIAL

## 2018-08-09 ENCOUNTER — HOSPITAL ENCOUNTER (EMERGENCY)
Facility: CLINIC | Age: 23
Discharge: HOME | End: 2018-08-09
Attending: PHYSICIAN ASSISTANT | Admitting: PHYSICIAN ASSISTANT
Payer: COMMERCIAL

## 2018-08-09 VITALS
SYSTOLIC BLOOD PRESSURE: 133 MMHG | DIASTOLIC BLOOD PRESSURE: 81 MMHG | RESPIRATION RATE: 18 BRPM | OXYGEN SATURATION: 97 % | TEMPERATURE: 99.1 F

## 2018-08-09 DIAGNOSIS — J02.9 ACUTE PHARYNGITIS, UNSPECIFIED ETIOLOGY: ICD-10-CM

## 2018-08-09 DIAGNOSIS — J45.901 EXACERBATION OF ASTHMA, UNSPECIFIED ASTHMA SEVERITY, UNSPECIFIED WHETHER PERSISTENT: ICD-10-CM

## 2018-08-09 DIAGNOSIS — J06.9 VIRAL URI WITH COUGH: ICD-10-CM

## 2018-08-09 LAB — HETEROPH AB SER QL: NEGATIVE

## 2018-08-09 PROCEDURE — G0463 HOSPITAL OUTPT CLINIC VISIT: HCPCS | Mod: 25

## 2018-08-09 PROCEDURE — 71046 X-RAY EXAM CHEST 2 VIEWS: CPT

## 2018-08-09 PROCEDURE — 86308 HETEROPHILE ANTIBODY SCREEN: CPT | Performed by: PHYSICIAN ASSISTANT

## 2018-08-09 PROCEDURE — 99213 OFFICE O/P EST LOW 20 MIN: CPT | Performed by: PHYSICIAN ASSISTANT

## 2018-08-09 RX ORDER — PREDNISONE 20 MG/1
TABLET ORAL
Qty: 10 TABLET | Refills: 0 | Status: SHIPPED | OUTPATIENT
Start: 2018-08-09 | End: 2018-10-15

## 2018-08-09 ASSESSMENT — ENCOUNTER SYMPTOMS
SHORTNESS OF BREATH: 1
MUSCULOSKELETAL NEGATIVE: 1
SINUS PRESSURE: 1
SORE THROAT: 1
GASTROINTESTINAL NEGATIVE: 1
FEVER: 1
WHEEZING: 1
CARDIOVASCULAR NEGATIVE: 1
FATIGUE: 1

## 2018-08-09 NOTE — ED AVS SNAPSHOT
[facility] Emergency Department    [address] [facility] [address]    Phone:  [phone]    Fax:  [phone]                                       James Webb   MRN: [id number]    Department:  [facility] Emergency Department   Date of Visit:  8/9/2018           After Visit Summary Signature Page     I have received my discharge instructions, and my questions have been answered. I have discussed any challenges I see with this plan with the nurse or doctor.    ..........................................................................................................................................  Patient/Patient Representative Signature      ..........................................................................................................................................  Patient Representative Print Name and Relationship to Patient    ..................................................               ................................................  Date                                            Time    ..........................................................................................................................................  Reviewed by Signature/Title    ...................................................              ..............................................  Date                                                            Time

## 2018-08-09 NOTE — ED AVS SNAPSHOT
[facility] Emergency Department    [address] [facility] [address]    Phone:  [phone]    Fax:  [phone]                                       James Webb   MRN: [id number]    Department:  [facility] Emergency Department   Date of Visit:  8/9/2018           Patient Information     Date Of Birth          1995        Your diagnoses for this visit were:     Acute pharyngitis, unspecified etiology     Viral URI with cough     Exacerbation of asthma, unspecified asthma severity, unspecified whether persistent        You were seen by Rocio Crawford PA-C.      Follow-up Information     Follow up with Grady Ayala PA-C. Call in 5 days.    Specialty:  Physician Assistant    Why:  As needed, For persistent symptoms    Contact information:    [address] [place] [address]  [phone]          Follow up with [facility] Emergency Department.    Specialty:  EMERGENCY MEDICINE    Why:  As needed, If symptoms worsen    Contact information:    [address] [place] [address] [phone]    Additional information:    The medical center is located at   [address] [place] [address] (between [facility] and   [facility] [address] in [place], [place] north   of [place]).        Discharge Instructions         Viral Upper Respiratory Illness (Adult)  You have a viral upper respiratory illness (URI), which is another term for the common cold. This illness is contagious during the first few days. It is spread through the air by coughing and sneezing. It may also be spread by direct contact (touching the sick person and then touching your own eyes, nose, or mouth). Frequent handwashing will decrease risk of spread. Most viral illnesses go away within 7 to 10 days with rest and simple home remedies. Sometimes the illness may last for several weeks. Antibiotics will not kill a virus, and they are generally not prescribed for this condition.    Home care    If symptoms are severe, rest at  home for the first 2 to 3 days. When you resume activity, don't let yourself get too tired.    Avoid being exposed to cigarette smoke (yours or others ).    You may use acetaminophen or ibuprofen to control pain and fever, unless another medicine was prescribed. If you have chronic liver or kidney disease, have ever had a stomach ulcer or gastrointestinal bleeding, or are taking blood-thinning medicines, talk with your healthcare provider before using these medicines. Aspirin should never be given to anyone under 18 years of age who is ill with a viral infection or fever. It may cause severe liver or brain damage.    Your appetite may be poor, so a light diet is fine. Avoid dehydration by drinking 6 to 8 glasses of fluids per day (water, soft drinks, juices, tea, or soup). Extra fluids will help loosen secretions in the nose and lungs.    Over-the-counter cold medicines will not shorten the length of time you re sick, but they may be helpful for the following symptoms: cough, sore throat, and nasal and sinus congestion. (Note: Do not use decongestants if you have high blood pressure.)  Follow-up care  Follow up with your healthcare provider, or as advised.  When to seek medical advice  Call your healthcare provider right away if any of these occur:    Cough with lots of colored sputum (mucus)    Severe headache; face, neck, or ear pain    Difficulty swallowing due to throat pain    Fever of 100.4 F (38 C) or higher, or as directed by your healthcare provider  Call 911  Call 911 if any of these occur:    Chest pain, shortness of breath, wheezing, or difficulty breathing    Coughing up blood    Inability to swallow due to throat pain  Date Last Reviewed: 9/13/2015 2000-2017 [employer]. [address] [place] [address]. All rights reserved. This information is not intended as a substitute for professional medical care. Always follow your healthcare professional's  instructions.          Discharge References/Attachments     PHARYNGITIS, VIRAL (ENGLISH)      24 Hour Appointment Hotline       To make an appointment at any [facility], call [phone] ([phone]). If you don't have a family doctor or clinic, we will help you find one. [facility] clinics are conveniently located to serve the needs of you and your family.             Review of your medicines      START taking        Dose / Directions Last dose taken    predniSONE 20 MG tablet   Commonly known as:  DELTASONE   Quantity:  10 tablet        Take two tablets (= 40mg) each day for 5 (five) days   Refills:  0          Our records show that you are taking the medicines listed below. If these are incorrect, please call your family doctor or clinic.        Dose / Directions Last dose taken    albuterol 108 (90 Base) MCG/ACT Inhaler   Commonly known as:  PROAIR HFA/PROVENTIL HFA/VENTOLIN HFA   Dose:  2 puff   Quantity:  1 Inhaler        Inhale 2 puffs into the lungs every 6 hours   Refills:  5        LORazepam 0.5 MG tablet   Commonly known as:  ATIVAN   Dose:  0.5 mg   Quantity:  20 tablet        Take 1 tablet (0.5 mg) by mouth every 8 hours as needed for anxiety   Refills:  0        penicillin V potassium 500 MG tablet   Commonly known as:  VEETID   Dose:  500 mg   Quantity:  20 tablet        Take 1 tablet (500 mg) by mouth 2 times daily   Refills:  0                Prescriptions were sent or printed at these locations (1 Prescription)                   [facility] [address] [place] [address]    Telephone:  [phone]   Fax:  [phone]   Hours:                  E-Prescribed (1 of 1)         predniSONE (DELTASONE) 20 MG tablet                Procedures and tests performed during your visit     Mono    XR Chest 2 Views      Orders Needing Specimen Collection     None      Pending Results     No orders found from 8/7/2018 to 8/10/2018.             Pending Culture Results     No orders found from 8/7/2018 to 8/10/2018.            Pending Results Instructions     If you had any lab results that were not finalized at the time of your Discharge, you can call the ED Lab Result RN at [phone]. You will be contacted by this team for any positive Lab results or changes in treatment. The nurses are available 7 days a week from 10A to 6:30P.  You can leave a message 24 hours per day and they will return your call.        Test Results From Your Hospital Stay        8/9/2018  1:17 PM      Component Results     Component Value Ref Range & Units Status    Mononucleosis Screen Negative NEG^Negative Final         8/9/2018  1:00 PM      Narrative     XR CHEST 2 VW 8/9/2018 12:55 PM     HISTORY: cough;         Impression     IMPRESSION: Negative exam.    DEDE DOLL MD                Thank you for choosing [facility]       Thank you for choosing [facility] for your care. Our goal is always to provide you with excellent care. Hearing back from our patients is one way we can continue to improve our services. Please take a few minutes to complete the written survey that you may receive in the mail after you visit with us. Thank you!        [employer] Information     [employer] gives you secure access to your electronic health record. If you see a primary care provider, you can also send messages to your care team and make appointments. If you have questions, please call your primary care clinic.  If you do not have a primary care provider, please call [phone] and they will assist you.        Care EveryWhere ID     This is your Care EveryWhere ID. This could be used by other organizations to access your [facility] medical records  [id number]        Equal Access to Services     [facility] BRETT : Hadii lynda Vo, waaxda luqadaha, qaybta kaalmada lalito, ericka jones. So [facility] [phone].    ATENCIÓN: Si enoc olivas douglas  disposición servicios gratuitos de asistencia lingüística. Ermelinda al [phone].    We comply with applicable federal civil rights laws and [place] laws. We do not discriminate on the basis of race, color, national origin, age, disability, sex, sexual orientation, or gender identity.            After Visit Summary       This is your record. Keep this with you and show to your community pharmacist(s) and doctor(s) at your next visit.

## 2018-08-09 NOTE — ED PROVIDER NOTES
History     Chief Complaint   Patient presents with     Pharyngitis     since last Saturday, on penicillan for 3 days, negative strep, coughing up red yellow mucous     HPI  James Webb is a 23 year old male with hx mild intermittent asthma who presents with complaints of persistent sore throat for the past 5 days.  Pt was evaluated in clinic 2 days ago for the same and had a negative rapid strep and strep culture at that time.  Pt was placed on Penicillin and one dose of Prednisone at that time.  Pt states he has been taking the Penicillin as prescribed without improvement.  His throat is still very painful and swollen.  Pt also c/o associated fevers up to 101*F last night, fatigue, nasal congestion, sinus pressure, cough, and shortness of breath for the same amount of time.  Pt states he is now coughing up yellow/red sputum production and is using his inhalers more.  Denies body aches, rash, or chest pain.  Pt states his friend has mono, and they recently shared a cigarette.  Pt is a smoker.      Problem List:    Patient Active Problem List    Diagnosis Date Noted     Infectious mononucleosis 10/25/2016     Priority: Medium     Mononucleosis 10/25/2016     Priority: Medium     Mild intermittent asthma 12/06/2005     Priority: Medium     Attention deficit hyperactivity disorder (ADHD) 05/03/2005     Priority: Medium     Problem list name updated by automated process. Provider to review       Depression/Anxiety 05/03/2005     Priority: Medium        Past Medical History:    No past medical history on file.    Past Surgical History:    Past Surgical History:   Procedure Laterality Date     SURGICAL HISTORY OF -   07/1999    PE Tubes       Family History:    Family History   Problem Relation Age of Onset     Hypertension Maternal Grandmother      Cancer - colorectal Other      colon cancer      Respiratory Maternal Aunt      Thyroid Disease Maternal Aunt        Social History:  Marital Status:  Single  [1]  Social History   Substance Use Topics     Smoking status: Current Every Day Smoker     Packs/day: 0.50     Years: 5.00     Types: Cigarettes     Smokeless tobacco: Never Used     Alcohol use No        Medications:      predniSONE (DELTASONE) 20 MG tablet   albuterol (PROAIR HFA/PROVENTIL HFA/VENTOLIN HFA) 108 (90 Base) MCG/ACT Inhaler   LORazepam (ATIVAN) 0.5 MG tablet   penicillin V potassium (VEETID) 500 MG tablet         Review of Systems   Constitutional: Positive for fatigue and fever.   HENT: Positive for congestion, sinus pressure and sore throat.    Respiratory: Positive for shortness of breath and wheezing.    Cardiovascular: Negative.  Negative for chest pain.   Gastrointestinal: Negative.    Musculoskeletal: Negative.    Skin: Negative.    All other systems reviewed and are negative.      Physical Exam   BP: 133/81  Heart Rate: 102  Temp: 99.1  F (37.3  C)  Resp: 18  SpO2: 97 %      Physical Exam   Constitutional: He is oriented to person, place, and time. He appears well-developed and well-nourished.  Non-toxic appearance. No distress.   HENT:   Head: Normocephalic and atraumatic.   Right Ear: Tympanic membrane, external ear and ear canal normal.   Left Ear: Tympanic membrane, external ear and ear canal normal.   Nose: Mucosal edema present.   Mouth/Throat: Uvula is midline and mucous membranes are normal. No uvula swelling. Posterior oropharyngeal erythema present. No oropharyngeal exudate, posterior oropharyngeal edema or tonsillar abscesses.   Tonsils are 2+ bilaterally with exudate.  No evidence of PTA   Eyes: Conjunctivae and EOM are normal. Pupils are equal, round, and reactive to light.   Neck: Normal range of motion, full passive range of motion without pain and phonation normal. Neck supple. No spinous process tenderness and no muscular tenderness present. No rigidity. Normal range of motion present.   Cardiovascular: Normal rate, regular rhythm and normal heart sounds.    Pulmonary/Chest:  Effort normal and breath sounds normal. No respiratory distress. He has no wheezes. He has no rales.   Musculoskeletal: Normal range of motion.   Lymphadenopathy:     He has cervical adenopathy.        Right cervical: Superficial cervical adenopathy present.        Left cervical: Superficial cervical adenopathy present.   Neurological: He is alert and oriented to person, place, and time.   Skin: Skin is warm and dry. No rash noted.       ED Course     ED Course     Procedures    Results for orders placed or performed during the hospital encounter of 08/09/18 (from the past 24 hour(s))   Mono   Result Value Ref Range    Mononucleosis Screen Negative NEG^Negative   XR Chest 2 Views    Narrative    XR CHEST 2 VW 8/9/2018 12:55 PM     HISTORY: cough;       Impression    IMPRESSION: Negative exam.    DEDE DOLL MD       Medications - No data to display    Assessments & Plan (with Medical Decision Making)     Pt is a 23 year old male with hx mild intermittent asthma who presents with complaints of persistent sore throat for the past 5 days.  Pt was evaluated in clinic 2 days ago for the same and had a negative rapid strep and strep culture at that time.  Pt was placed on Penicillin and one dose of Prednisone at that time.  Pt states he has been taking the Penicillin as prescribed without improvement.  His throat is still very painful and swollen.  Pt also c/o associated fevers up to 101*F last night, fatigue, nasal congestion, sinus pressure, cough, and shortness of breath for the same amount of time.  Pt states he is now coughing up yellow/red sputum production and is using his inhalers more.  Pt states his friend has mono, and they recently shared a cigarette.  Pt is afebrile on arrival.  Exam as above.  Mono was negative.  CXR was negative for pneumonia or acute pathology.  Discussed results with patient.  We discussed that he likely has some other viral infection.  Encouraged symptomatic treatments at home.   Return precautions were reviewed.  Hand-outs were provided.    Patient was sent with Prednisone burst and was instructed to follow-up with PCP if no improvement in 5-7 days for continued care and management or sooner if new or worsening symptoms.  He is to return to the ED for persistent and/or worsening symptoms.  Patient expressed understanding of the diagnosis and plan and was discharged home in good condition.    I have reviewed the nursing notes.    I have reviewed the findings, diagnosis, plan and need for follow up with the patient.    Discharge Medication List as of 8/9/2018  1:45 PM      START taking these medications    Details   predniSONE (DELTASONE) 20 MG tablet Take two tablets (= 40mg) each day for 5 (five) days, Disp-10 tablet, R-0, E-Prescribe             Final diagnoses:   Acute pharyngitis, unspecified etiology   Viral URI with cough   Exacerbation of asthma, unspecified asthma severity, unspecified whether persistent       8/9/2018   [facility] EMERGENCY DEPARTMENT     Rocio Crawford PA-C  08/09/18 1400

## 2018-08-09 NOTE — DISCHARGE INSTRUCTIONS
Viral Upper Respiratory Illness (Adult)  You have a viral upper respiratory illness (URI), which is another term for the common cold. This illness is contagious during the first few days. It is spread through the air by coughing and sneezing. It may also be spread by direct contact (touching the sick person and then touching your own eyes, nose, or mouth). Frequent handwashing will decrease risk of spread. Most viral illnesses go away within 7 to 10 days with rest and simple home remedies. Sometimes the illness may last for several weeks. Antibiotics will not kill a virus, and they are generally not prescribed for this condition.    Home care    If symptoms are severe, rest at home for the first 2 to 3 days. When you resume activity, don't let yourself get too tired.    Avoid being exposed to cigarette smoke (yours or others ).    You may use acetaminophen or ibuprofen to control pain and fever, unless another medicine was prescribed. If you have chronic liver or kidney disease, have ever had a stomach ulcer or gastrointestinal bleeding, or are taking blood-thinning medicines, talk with your healthcare provider before using these medicines. Aspirin should never be given to anyone under 18 years of age who is ill with a viral infection or fever. It may cause severe liver or brain damage.    Your appetite may be poor, so a light diet is fine. Avoid dehydration by drinking 6 to 8 glasses of fluids per day (water, soft drinks, juices, tea, or soup). Extra fluids will help loosen secretions in the nose and lungs.    Over-the-counter cold medicines will not shorten the length of time you re sick, but they may be helpful for the following symptoms: cough, sore throat, and nasal and sinus congestion. (Note: Do not use decongestants if you have high blood pressure.)  Follow-up care  Follow up with your healthcare provider, or as advised.  When to seek medical advice  Call your healthcare provider right away if any of these  occur:    Cough with lots of colored sputum (mucus)    Severe headache; face, neck, or ear pain    Difficulty swallowing due to throat pain    Fever of 100.4 F (38 C) or higher, or as directed by your healthcare provider  Call 911  Call 911 if any of these occur:    Chest pain, shortness of breath, wheezing, or difficulty breathing    Coughing up blood    Inability to swallow due to throat pain  Date Last Reviewed: 9/13/2015 2000-2017 [employer]. [address] [place] [address]. All rights reserved. This information is not intended as a substitute for professional medical care. Always follow your healthcare professional's instructions.

## 2018-10-15 ENCOUNTER — RADIANT APPOINTMENT (OUTPATIENT)
Dept: GENERAL RADIOLOGY | Facility: CLINIC | Age: 23
End: 2018-10-15
Attending: PHYSICIAN ASSISTANT
Payer: COMMERCIAL

## 2018-10-15 ENCOUNTER — OFFICE VISIT (OUTPATIENT)
Dept: FAMILY MEDICINE | Facility: CLINIC | Age: 23
End: 2018-10-15
Payer: COMMERCIAL

## 2018-10-15 VITALS
HEART RATE: 76 BPM | OXYGEN SATURATION: 98 % | HEIGHT: 68 IN | WEIGHT: 185.2 LBS | DIASTOLIC BLOOD PRESSURE: 72 MMHG | SYSTOLIC BLOOD PRESSURE: 116 MMHG | BODY MASS INDEX: 28.07 KG/M2 | TEMPERATURE: 95.7 F | RESPIRATION RATE: 16 BRPM

## 2018-10-15 DIAGNOSIS — S09.90XA CLOSED HEAD INJURY, INITIAL ENCOUNTER: Primary | ICD-10-CM

## 2018-10-15 DIAGNOSIS — M54.6 ACUTE BILATERAL THORACIC BACK PAIN: ICD-10-CM

## 2018-10-15 DIAGNOSIS — S09.90XA CLOSED HEAD INJURY, INITIAL ENCOUNTER: ICD-10-CM

## 2018-10-15 PROCEDURE — 72070 X-RAY EXAM THORAC SPINE 2VWS: CPT | Mod: FY

## 2018-10-15 PROCEDURE — 70260 X-RAY EXAM OF SKULL: CPT | Mod: FY

## 2018-10-15 PROCEDURE — 99214 OFFICE O/P EST MOD 30 MIN: CPT | Performed by: PHYSICIAN ASSISTANT

## 2018-10-15 ASSESSMENT — ENCOUNTER SYMPTOMS
SORE THROAT: 0
EYE DISCHARGE: 0
CHILLS: 0
WHEEZING: 0
BACK PAIN: 1
HEADACHES: 1
NAUSEA: 0
DIARRHEA: 0
EYE REDNESS: 0
ABDOMINAL PAIN: 0
VOMITING: 0
BLURRED VISION: 0
SHORTNESS OF BREATH: 0
MYALGIAS: 0
PALPITATIONS: 0
COUGH: 0
FEVER: 0

## 2018-10-15 NOTE — NURSING NOTE
"Chief Complaint   Patient presents with     Musculoskeletal Problem       Initial /72  Pulse 76  Temp 95.7  F (35.4  C) (Tympanic)  Resp 16  Ht 5' 7.75\" (1.721 m)  Wt 185 lb 3.2 oz (84 kg)  SpO2 98%  BMI 28.37 kg/m2 Estimated body mass index is 28.37 kg/(m^2) as calculated from the following:    Height as of this encounter: 5' 7.75\" (1.721 m).    Weight as of this encounter: 185 lb 3.2 oz (84 kg).    Patient presents to the clinic using No DME    Health Maintenance that is potentially due pending provider review:  NONE    n/a    Is there anyone who you would like to be able to receive your results? No  If yes have patient fill out SEBAS      "

## 2018-10-15 NOTE — MR AVS SNAPSHOT
After Visit Summary   10/15/2018    James Webb    MRN: [id number]           Patient Information     Date Of Birth          1995        Visit Information        Provider Department      10/15/2018 1:20 PM Leanna Melgar PA-C [facility]        Today's Diagnoses     Closed head injury, initial encounter    -  1    Acute bilateral thoracic back pain           Follow-ups after your visit        Follow-up notes from your care team     Return if symptoms worsen or fail to improve.      Who to contact     If you have questions or need follow up information about today's clinic visit or your schedule please contact [facility] directly at [phone].  Normal or non-critical lab and imaging results will be communicated to you by [employer], letter or phone within 4 business days after the clinic has received the results. If you do not hear from us within 7 days, please contact the clinic through [employer] or phone. If you have a critical or abnormal lab result, we will notify you by phone as soon as possible.  Submit refill requests through [employer] or call your pharmacy and they will forward the refill request to us. Please allow 3 business days for your refill to be completed.          Additional Information About Your Visit        MyChart Information     [employer] gives you secure access to your electronic health record. If you see a primary care provider, you can also send messages to your care team and make appointments. If you have questions, please call your primary care clinic.  If you do not have a primary care provider, please call [phone] and they will assist you.        Care EveryWhere ID     This is your Care EveryWhere ID. This could be used by other organizations to access your [facility] medical records  [id number]        Your Vitals Were     Pulse Temperature Respirations Height Pulse Oximetry BMI (Body Mass Index)    76 95.7  F (35.4  C)  "(Tympanic) 16 5' 7.75\" (1.721 m) 98% 28.37 kg/m2       Blood Pressure from Last 3 Encounters:   10/15/18 116/72   08/09/18 133/81   08/07/18 (P) 114/62    Weight from Last 3 Encounters:   10/15/18 185 lb 3.2 oz (84 kg)   08/07/18 185 lb 3.2 oz (84 kg)   12/20/17 180 lb (81.6 kg)               Primary Care Provider Office Phone # Fax #    Grady Ayala PA-C [phone]       [facility] [address] [facility] [address]        Equal Access to Services     [employer] [place]ST : Carlos A Vo, wadinora branham, qaybta kaalmada lalito, ericka jones. So [facility] [phone].    ATENCIÓN: Si habla español, tiene a douglas disposición servicios gratuitos de asistencia lingüística. Llame al [phone].    We comply with applicable federal civil rights laws and [place] laws. We do not discriminate on the basis of race, color, national origin, age, disability, sex, sexual orientation, or gender identity.            Thank you!     Thank you for choosing [facility]  for your care. Our goal is always to provide you with excellent care. Hearing back from our patients is one way we can continue to improve our services. Please take a few minutes to complete the written survey that you may receive in the mail after your visit with us. Thank you!             Your Updated Medication List - Protect others around you: Learn how to safely use, store and throw away your medicines at www.disposemymeds.org.          This list is accurate as of 10/15/18  4:47 PM.  Always use your most recent med list.                   Brand Name Dispense Instructions for use Diagnosis    albuterol 108 (90 Base) MCG/ACT inhaler    PROAIR HFA/PROVENTIL HFA/VENTOLIN HFA    1 Inhaler    Inhale 2 puffs into the lungs every 6 hours    Mild intermittent asthma without complication       LORazepam 0.5 MG tablet    ATIVAN    20 tablet    Take 1 tablet (0.5 mg) by mouth every 8 " hours as needed for anxiety    Fear of flying
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
## 2019-02-07 ENCOUNTER — OFFICE VISIT (OUTPATIENT)
Dept: URGENT CARE | Facility: URGENT CARE | Age: 24
End: 2019-02-07
Payer: COMMERCIAL

## 2019-02-07 VITALS
DIASTOLIC BLOOD PRESSURE: 50 MMHG | WEIGHT: 185 LBS | HEART RATE: 64 BPM | TEMPERATURE: 98.4 F | BODY MASS INDEX: 28.34 KG/M2 | SYSTOLIC BLOOD PRESSURE: 110 MMHG

## 2019-02-07 DIAGNOSIS — J02.0 STREP THROAT: Primary | ICD-10-CM

## 2019-02-07 DIAGNOSIS — J02.9 SORE THROAT: ICD-10-CM

## 2019-02-07 LAB
DEPRECATED S PYO AG THROAT QL EIA: ABNORMAL
SPECIMEN SOURCE: ABNORMAL

## 2019-02-07 PROCEDURE — 99213 OFFICE O/P EST LOW 20 MIN: CPT | Performed by: NURSE PRACTITIONER

## 2019-02-07 PROCEDURE — 87880 STREP A ASSAY W/OPTIC: CPT | Performed by: NURSE PRACTITIONER

## 2019-02-07 RX ORDER — AMOXICILLIN 875 MG
875 TABLET ORAL 2 TIMES DAILY
Qty: 20 TABLET | Refills: 0 | Status: SHIPPED | OUTPATIENT
Start: 2019-02-07 | End: 2019-02-17

## 2019-02-07 NOTE — PROGRESS NOTES
SUBJECTIVE:   James Webb is a 24 year old male who presents to clinic today for the following health issues:  Chief Complaint   Patient presents with     Pharyngitis     1-2 weeks ago.  Sore throat and has white spots in throat.  99.3 yesterday                  Problem list and histories reviewed & adjusted, as indicated.  Additional history: as documented    Patient Active Problem List   Diagnosis     Attention deficit hyperactivity disorder (ADHD)     Depression/Anxiety     Mild intermittent asthma     Infectious mononucleosis     Mononucleosis     Past Surgical History:   Procedure Laterality Date     SURGICAL HISTORY OF -   07/1999    PE Tubes       Social History     Tobacco Use     Smoking status: Current Every Day Smoker     Packs/day: 0.50     Years: 5.00     Pack years: 2.50     Types: Cigarettes     Smokeless tobacco: Never Used   Substance Use Topics     Alcohol use: No     Family History   Problem Relation Age of Onset     Hypertension Maternal Grandmother      Cancer - colorectal Other         colon cancer      Respiratory Maternal Aunt      Thyroid Disease Maternal Aunt          Current Outpatient Medications   Medication Sig Dispense Refill     albuterol (PROAIR HFA/PROVENTIL HFA/VENTOLIN HFA) 108 (90 Base) MCG/ACT Inhaler Inhale 2 puffs into the lungs every 6 hours 1 Inhaler 5     amoxicillin (AMOXIL) 875 MG tablet Take 1 tablet (875 mg) by mouth 2 times daily for 10 days 20 tablet 0     LORazepam (ATIVAN) 0.5 MG tablet Take 1 tablet (0.5 mg) by mouth every 8 hours as needed for anxiety 20 tablet 0     No Known Allergies  Labs reviewed in EPIC    Reviewed and updated as needed this visit by clinical staff  Tobacco  Allergies  Meds  Problems  Med Hx  Surg Hx  Fam Hx  Soc Hx        Reviewed and updated as needed this visit by Provider  Tobacco  Allergies  Meds  Problems  Med Hx  Surg Hx  Fam Hx         ROS:  Constitutional, HEENT, cardiovascular, pulmonary, GI, ,  musculoskeletal, neuro, skin, endocrine and psych systems are negative, except as otherwise noted.    OBJECTIVE:     /50 (BP Location: Right arm, Patient Position: Chair, Cuff Size: Adult Regular)   Pulse 64   Temp 98.4  F (36.9  C) (Tympanic)   Wt 83.9 kg (185 lb)   BMI 28.34 kg/m    Body mass index is 28.34 kg/m .   GENERAL: healthy, alert and no distress, nontoxic in appearance  EYES: Eyes grossly normal to inspection, PERRL and conjunctivae and sclerae normal  HENT: ear canals and TM's normal, nose and mouth without ulcers or lesions, throat red and moderately swollen with large amount of exudate. Tonsils do not meet at midline. Uvula is midline.  NECK: no adenopathy, supple with full ROM  RESP: lungs clear to auscultation - no rales, rhonchi or wheezes  CV: regular rate and rhythm, normal S1 S2, no S3 or S4, no murmur, click or rub, no peripheral edema   ABDOMEN: soft, nontender, no hepatosplenomegaly, no masses and bowel sounds normal  MS: no gross musculoskeletal defects noted, no edema  No rash    Diagnostic Test Results:  Results for orders placed or performed in visit on 02/07/19 (from the past 24 hour(s))   Strep, Rapid Screen   Result Value Ref Range    Specimen Description Throat     Rapid Strep A Screen (A)      POSITIVE: Group A Streptococcal antigen detected by immunoassay.       ASSESSMENT/PLAN:     Problem List Items Addressed This Visit     None      Visit Diagnoses     Strep throat    -  Primary    Relevant Medications    amoxicillin (AMOXIL) 875 MG tablet    Sore throat        Relevant Orders    Strep, Rapid Screen (Completed)               Patient Instructions   Increase rest and fluids. Tylenol and/or Ibuprofen for comfort. Cool mist vaporizer. If your symptoms worsen or do not resolve follow up with your primary care provider in 1 week and sooner if needed.        Indications for emergent return to emergency department discussed with patient, who verbalized good understanding and  agreement.  Patient understands the limitations of today's evaluation.           Patient Education     Pharyngitis: Strep (Confirmed)    You have had a positive test for strep throat. Strep throat is a contagious illness. It is spread by coughing, kissing or by touching others after touching your mouth or nose. Symptoms include throat pain that is worse with swallowing, aching all over, headache, and fever. It is treated with antibiotic medicine. This should help you start to feel better in 1 to 2 days.  Home care    Rest at home. Drink plenty of fluids to you won't get dehydrated.    No work or school for the first 2 days of taking the antibiotics. After this time, you will not be contagious. You can then return to school or work if you are feeling better.     Take antibiotic medicine for the full 10 days, even if you feel better. This is very important to ensure the infection is treated. It is also important to prevent medicine-resistant germs from developing. If you were given an antibiotic shot, you don't need any more antibiotics.    You may use acetaminophen or ibuprofen to control pain or fever, unless another medicine was prescribed for this. Talk with your healthcare provider before taking these medicines if you have chronic liver or kidney disease. Also talk with your healthcare provider if you have had a stomach ulcer or GI bleeding.    Throat lozenges or sprays help reduce pain. Gargling with warm saltwater will also reduce throat pain. Dissolve 1/2 teaspoon of salt in 1 glass of warm water. This may be useful just before meals.     Soft foods are OK. Don't eat salty or spicy foods.  Follow-up care  Follow up with your healthcare provider or our staff if you don't get better over the next week.  When to seek medical advice  Call your healthcare provider right away if any of these occur:    Fever of 100.4 F (38 C) or higher, or as directed by your healthcare provider    New or worsening ear pain, sinus  pain, or headache    Painful lumps in the back of neck    Stiff neck    Lymph nodes getting larger or becoming soft in the middle    You can't swallow liquids or you can't open your mouth wide because of throat pain    Signs of dehydration. These include very dark urine or no urine, sunken eyes, and dizziness.    Trouble breathing or noisy breathing    Muffled voice    Rash  Prevention  Here are steps you can take to help prevent an infection:    Keep good hand washing habits.    Don t have close contact with people who have sore throats, colds, or other upper respiratory infections.    Don t smoke, and stay away from secondhand smoke.  Date Last Reviewed: 11/1/2017 2000-2018 [employer]. [address] [place] [address], [place] [address]. All rights reserved. This information is not intended as a substitute for professional medical care. Always follow your healthcare professional's instructions.               SENDY Hager [facility]

## 2019-02-07 NOTE — NURSING NOTE
"Chief Complaint   Patient presents with     Pharyngitis     1-2 weeks ago.  Sore throat and has white spots in throat.  99.3 yesterday        Initial /50 (BP Location: Right arm, Patient Position: Chair, Cuff Size: Adult Regular)   Pulse 64   Temp 98.4  F (36.9  C) (Tympanic)   Wt 83.9 kg (185 lb)   BMI 28.34 kg/m   Estimated body mass index is 28.34 kg/m  as calculated from the following:    Height as of 10/15/18: 1.721 m (5' 7.75\").    Weight as of this encounter: 83.9 kg (185 lb).    Patient presents to the clinic using No DME    Health Maintenance that is potentially due pending provider review:  NONE    n/a    Is there anyone who you would like to be able to receive your results? Not Applicable  If yes have patient fill out SEBAS    Landon Yung M.A.      "

## 2019-02-07 NOTE — PATIENT INSTRUCTIONS
Increase rest and fluids. Tylenol and/or Ibuprofen for comfort. Cool mist vaporizer. If your symptoms worsen or do not resolve follow up with your primary care provider in 1 week and sooner if needed.        Indications for emergent return to emergency department discussed with patient, who verbalized good understanding and agreement.  Patient understands the limitations of today's evaluation.           Patient Education     Pharyngitis: Strep (Confirmed)    You have had a positive test for strep throat. Strep throat is a contagious illness. It is spread by coughing, kissing or by touching others after touching your mouth or nose. Symptoms include throat pain that is worse with swallowing, aching all over, headache, and fever. It is treated with antibiotic medicine. This should help you start to feel better in 1 to 2 days.  Home care    Rest at home. Drink plenty of fluids to you won't get dehydrated.    No work or school for the first 2 days of taking the antibiotics. After this time, you will not be contagious. You can then return to school or work if you are feeling better.     Take antibiotic medicine for the full 10 days, even if you feel better. This is very important to ensure the infection is treated. It is also important to prevent medicine-resistant germs from developing. If you were given an antibiotic shot, you don't need any more antibiotics.    You may use acetaminophen or ibuprofen to control pain or fever, unless another medicine was prescribed for this. Talk with your healthcare provider before taking these medicines if you have chronic liver or kidney disease. Also talk with your healthcare provider if you have had a stomach ulcer or GI bleeding.    Throat lozenges or sprays help reduce pain. Gargling with warm saltwater will also reduce throat pain. Dissolve 1/2 teaspoon of salt in 1 glass of warm water. This may be useful just before meals.     Soft foods are OK. Don't eat salty or spicy  foods.  Follow-up care  Follow up with your healthcare provider or our staff if you don't get better over the next week.  When to seek medical advice  Call your healthcare provider right away if any of these occur:    Fever of 100.4 F (38 C) or higher, or as directed by your healthcare provider    New or worsening ear pain, sinus pain, or headache    Painful lumps in the back of neck    Stiff neck    Lymph nodes getting larger or becoming soft in the middle    You can't swallow liquids or you can't open your mouth wide because of throat pain    Signs of dehydration. These include very dark urine or no urine, sunken eyes, and dizziness.    Trouble breathing or noisy breathing    Muffled voice    Rash  Prevention  Here are steps you can take to help prevent an infection:    Keep good hand washing habits.    Don t have close contact with people who have sore throats, colds, or other upper respiratory infections.    Don t smoke, and stay away from secondhand smoke.  Date Last Reviewed: 11/1/2017 2000-2018 [employer]. [address] [place] [address], [place] [address]. All rights reserved. This information is not intended as a substitute for professional medical care. Always follow your healthcare professional's instructions.

## 2019-11-03 ENCOUNTER — HEALTH MAINTENANCE LETTER (OUTPATIENT)
Age: 24
End: 2019-11-03

## 2019-12-09 ENCOUNTER — OFFICE VISIT (OUTPATIENT)
Dept: FAMILY MEDICINE | Facility: CLINIC | Age: 24
End: 2019-12-09
Payer: COMMERCIAL

## 2019-12-09 VITALS
RESPIRATION RATE: 18 BRPM | DIASTOLIC BLOOD PRESSURE: 72 MMHG | HEART RATE: 76 BPM | BODY MASS INDEX: 30.61 KG/M2 | HEIGHT: 67 IN | WEIGHT: 195 LBS | TEMPERATURE: 98.9 F | SYSTOLIC BLOOD PRESSURE: 114 MMHG | OXYGEN SATURATION: 98 %

## 2019-12-09 DIAGNOSIS — F51.01 PRIMARY INSOMNIA: ICD-10-CM

## 2019-12-09 DIAGNOSIS — F41.9 ANXIETY: Primary | ICD-10-CM

## 2019-12-09 DIAGNOSIS — F34.1 DYSTHYMIC DISORDER: Chronic | ICD-10-CM

## 2019-12-09 DIAGNOSIS — F10.10 ALCOHOL ABUSE, EPISODIC DRINKING BEHAVIOR: ICD-10-CM

## 2019-12-09 PROCEDURE — 99214 OFFICE O/P EST MOD 30 MIN: CPT | Performed by: FAMILY MEDICINE

## 2019-12-09 RX ORDER — SERTRALINE HYDROCHLORIDE 100 MG/1
100 TABLET, FILM COATED ORAL DAILY
Qty: 30 TABLET | Refills: 3 | Status: SHIPPED | OUTPATIENT
Start: 2019-12-09 | End: 2020-04-02 | Stop reason: SINTOL

## 2019-12-09 RX ORDER — TRAZODONE HYDROCHLORIDE 50 MG/1
50 TABLET, FILM COATED ORAL AT BEDTIME
Qty: 30 TABLET | Refills: 3 | Status: SHIPPED | OUTPATIENT
Start: 2019-12-09 | End: 2020-04-02

## 2019-12-09 ASSESSMENT — PATIENT HEALTH QUESTIONNAIRE - PHQ9
SUM OF ALL RESPONSES TO PHQ QUESTIONS 1-9: 8
5. POOR APPETITE OR OVEREATING: SEVERAL DAYS

## 2019-12-09 ASSESSMENT — ANXIETY QUESTIONNAIRES
6. BECOMING EASILY ANNOYED OR IRRITABLE: SEVERAL DAYS
5. BEING SO RESTLESS THAT IT IS HARD TO SIT STILL: MORE THAN HALF THE DAYS
3. WORRYING TOO MUCH ABOUT DIFFERENT THINGS: SEVERAL DAYS
GAD7 TOTAL SCORE: 8
2. NOT BEING ABLE TO STOP OR CONTROL WORRYING: NOT AT ALL
1. FEELING NERVOUS, ANXIOUS, OR ON EDGE: SEVERAL DAYS
7. FEELING AFRAID AS IF SOMETHING AWFUL MIGHT HAPPEN: MORE THAN HALF THE DAYS

## 2019-12-09 ASSESSMENT — MIFFLIN-ST. JEOR: SCORE: 1838.14

## 2019-12-09 NOTE — PROGRESS NOTES
"James Webb is a 24 year old male comes in today with the following concerns.      1. Discuss ADHD and starting medication    2. Refill lorazepam. Dose adjustment? Not effective.     Zara Solitario CMA([facility])      *    S: James Webb is a 24 year old male with anxiety.  Drinking to point of passing out 3 nights a week.  Anxiety worse during day.  Several abdifatah/cokes, maybe some beer  Too.      Concerns about focus as well.     Mom here, didn't know about his drinking.      Not sleeping well, drinks to fall asleep often    Problem list, med list, additional histories reviewed and updated, as indicated.      O;/72   Pulse 76   Temp 98.9  F (37.2  C) (Tympanic)   Resp 18   Ht 1.702 m (5' 7\")   Wt 88.5 kg (195 lb)   SpO2 98%   BMI 30.54 kg/m    GEN: Alert and oriented, in no acute distress  CV: RRR, no murmur  EXT: no edema or lesions noted in lower extremities    A: alcohol abuse      Anxiety       Insomnia    P; needs to take an alcohol break.  One month washout.   Will help determine if he's addicted, if he can cut back on his own, if he needs help or not    zoloft start, 50mg for 2 weeks, then full tab. Trazodone at night to help sleep.      Told him alcohol will  Cancel out effects from zoloft.  He understands.  Mom will help too.      Back in 6 weeks.  See how this worked.      TT 30 min, more than 1/2 that time counseling on alcohol, anxiety, zoloft, sleep , self cares, follow up.        "

## 2019-12-09 NOTE — NURSING NOTE
"Chief Complaint   Patient presents with     A.D.H.D       Initial /72   Pulse 76   Temp 98.9  F (37.2  C) (Tympanic)   Resp 18   Ht 1.702 m (5' 7\")   Wt 88.5 kg (195 lb)   SpO2 98%   BMI 30.54 kg/m   Estimated body mass index is 30.54 kg/m  as calculated from the following:    Height as of this encounter: 1.702 m (5' 7\").    Weight as of this encounter: 88.5 kg (195 lb).    Patient presents to the clinic using No DME    Health Maintenance that is potentially due pending provider review:  Flu shot declined.       Is there anyone who you would like to be able to receive your results? No  If yes have patient fill out SEBAS    Zara Solitario CMA([facility])    "

## 2019-12-10 ASSESSMENT — ANXIETY QUESTIONNAIRES: GAD7 TOTAL SCORE: 8

## 2019-12-10 ASSESSMENT — ASTHMA QUESTIONNAIRES: ACT_TOTALSCORE: 22

## 2020-01-31 ENCOUNTER — OFFICE VISIT (OUTPATIENT)
Dept: FAMILY MEDICINE | Facility: CLINIC | Age: 25
End: 2020-01-31
Payer: COMMERCIAL

## 2020-01-31 VITALS
HEIGHT: 67 IN | TEMPERATURE: 96.9 F | RESPIRATION RATE: 18 BRPM | SYSTOLIC BLOOD PRESSURE: 110 MMHG | DIASTOLIC BLOOD PRESSURE: 64 MMHG | HEART RATE: 67 BPM | OXYGEN SATURATION: 97 % | BODY MASS INDEX: 29.73 KG/M2 | WEIGHT: 189.4 LBS

## 2020-01-31 DIAGNOSIS — M54.2 NECK PAIN: ICD-10-CM

## 2020-01-31 DIAGNOSIS — T14.8XXA WOUND OF SKIN: ICD-10-CM

## 2020-01-31 DIAGNOSIS — R05.3 CHRONIC COUGH: ICD-10-CM

## 2020-01-31 DIAGNOSIS — K02.9 DENTAL CARIES: Primary | ICD-10-CM

## 2020-01-31 PROCEDURE — 99214 OFFICE O/P EST MOD 30 MIN: CPT | Performed by: NURSE PRACTITIONER

## 2020-01-31 RX ORDER — TIZANIDINE 2 MG/1
2 TABLET ORAL 3 TIMES DAILY PRN
Qty: 30 TABLET | Refills: 1 | Status: SHIPPED | OUTPATIENT
Start: 2020-01-31 | End: 2021-10-22

## 2020-01-31 ASSESSMENT — MIFFLIN-ST. JEOR: SCORE: 1817.86

## 2020-01-31 NOTE — PROGRESS NOTES
Subjective     James Webb is a 24 year old male who presents to clinic today for the following health issues: neck pain, mainly affecting right side radiating into occipital area. The patient states stretching exercises usually helps to decrease pain. Also complains of chronic cough, the patient has history of asthma and still smokes.   Complains of small wound on the right hand 4 th finger, concern about possible infection.  Complains of dental pain, he missed appointment with his dentist this morning due to anxiety, states he likely will need anesthesia, if dentist will recommend any procedures/surgeries.     HPI   Headache  Onset: 2-3 weeks    Description:   Location: unilateral in the right occipital area   Character: throbbing pain, sharp pain  Frequency:  daily  Duration:  1-2 hours    Intensity: mild, moderate    Progression of Symptoms:  same    Accompanying Signs & Symptoms:  Stiff neck: YES  Neck or upper back pain: YES- neck pain  Fever: no  Sinus pressure: no  Nausea or vomiting: no  Dizziness: no  Numbness: no  Weakness: no  Visual changes: no    History:   Head trauma: YES- at 12 years old, he was sledding and hit head on light post  Family history of migraines: YES  Previous tests for headaches: no  Neurologist evaluations: no  Able to do daily activities: YES  Wake with a headaches: no  Do headaches wake you up: no  Daily pain medication use: YES- ibuprofen  Work/school stressors/changes: YES- work and home stress    Precipitating factors:   Does light make it worse: no  Does sound make it worse: YES  Possible dental issues are causing headaches.     Alleviating factors:  Does sleep help: YES    Therapies Tried and outcome: Ibuprofen (Advil, Motrin) and Tylenol, napping, ice heat, essential oil diffuser           Reviewed and updated as needed this visit by Provider  Allergies  Problems         Review of Systems   ROS COMP: Constitutional, HEENT, cardiovascular, pulmonary, gi and gu systems  "are negative, except as otherwise noted.      Objective    /64 (BP Location: Left arm, Patient Position: Sitting, Cuff Size: Adult Large)   Pulse 67   Temp 96.9  F (36.1  C) (Tympanic)   Resp 18   Ht 1.71 m (5' 7.32\")   Wt 85.9 kg (189 lb 6.4 oz)   SpO2 97%   BMI 29.38 kg/m    Body mass index is 29.38 kg/m .  Physical Exam   GENERAL: healthy, alert and no distress  EYES: Eyes grossly normal to inspection, PERRL and conjunctivae and sclerae normal  HENT: normal cephalic/atraumatic, right ear: occluded with wax, left ear: normal: no effusions, no erythema, normal landmarks, oropharynx clear, oral mucous membranes moist and poor dental hygiene with two broken teeth-1st and 2nd molars with mild surrounding edema of the gums  NECK: no adenopathy, no asymmetry, masses, or scars and thyroid normal to palpation  RESP: lungs clear to auscultation - no rales, rhonchi or wheezes  CV: regular rate and rhythm, normal S1 S2, no S3 or S4, no murmur, click or rub, no peripheral edema and peripheral pulses strong  SKIN: small flat wound about 1 cm in diameter without evidence of infection on the top of the right ring finger   NEURO: Normal strength and tone, mentation intact and speech normal  PSYCH: mentation appears normal, affect normal/bright    Diagnostic Test Results:  Labs reviewed in Epic        Assessment & Plan     1. Dental caries  -recommended follow up with dentis, due to pain covered with antibiotic for possible dental infection     - amoxicillin-clavulanate (AUGMENTIN) 875-125 MG tablet; Take 1 tablet by mouth 2 times daily for 7 days  Dispense: 14 tablet; Refill: 0    2. Neck pain    - PHYSICAL THERAPY REFERRAL; Future  - tiZANidine (ZANAFLEX) 2 MG tablet; Take 1 tablet (2 mg) by mouth 3 times daily as needed for muscle spasms or other (neck pain)  Dispense: 30 tablet; Refill: 1    3. Wound of skin  -recommended Bacitracin twice daily for up to 5 days, keep area dry and clean and open to air as much as " possible     4. Chronic cough  -he has asthma and still smokes  -recommended to quit smoking, lungs exam is normal today        Tobacco Cessation:   reports that he has been smoking cigarettes. He has a 2.50 pack-year smoking history. He has never used smokeless tobacco.  Tobacco Cessation Action Plan: Information offered: Patient not interested at this time      See Patient Instructions    Return in about 1 week (around 2/7/2020), or if symptoms worsen or fail to improve.    SENDY [facility]

## 2020-01-31 NOTE — PATIENT INSTRUCTIONS
Quit smoking  Keep wound on your finger open to air as much as possible, apply Bacitracin twice daily for 3-5 days    For neck pain start physical therapy     Try Zanaflex 2 mg 3 times daily as needed for neck pain  Avoid frequent use of Ibuprofen because it can cause rebound headaches    Tylenol 500 mg 4 times daily as needed for pain and headaches      Start Augmentin 1 tab twice daily for 7 days for possible dental infection    Schedule with dentist.

## 2020-03-02 ENCOUNTER — ANCILLARY PROCEDURE (OUTPATIENT)
Dept: GENERAL RADIOLOGY | Facility: CLINIC | Age: 25
End: 2020-03-02
Attending: NURSE PRACTITIONER
Payer: COMMERCIAL

## 2020-03-02 ENCOUNTER — OFFICE VISIT (OUTPATIENT)
Dept: FAMILY MEDICINE | Facility: CLINIC | Age: 25
End: 2020-03-02
Payer: COMMERCIAL

## 2020-03-02 VITALS
OXYGEN SATURATION: 97 % | RESPIRATION RATE: 16 BRPM | TEMPERATURE: 97.9 F | SYSTOLIC BLOOD PRESSURE: 118 MMHG | DIASTOLIC BLOOD PRESSURE: 80 MMHG | HEIGHT: 67 IN | WEIGHT: 185.8 LBS | HEART RATE: 72 BPM | BODY MASS INDEX: 29.16 KG/M2

## 2020-03-02 DIAGNOSIS — M79.641 PAIN OF RIGHT HAND: Primary | ICD-10-CM

## 2020-03-02 DIAGNOSIS — M79.641 PAIN OF RIGHT HAND: ICD-10-CM

## 2020-03-02 PROCEDURE — 73130 X-RAY EXAM OF HAND: CPT | Mod: RT

## 2020-03-02 PROCEDURE — 99213 OFFICE O/P EST LOW 20 MIN: CPT | Performed by: NURSE PRACTITIONER

## 2020-03-02 ASSESSMENT — MIFFLIN-ST. JEOR: SCORE: 1791.41

## 2020-03-02 NOTE — NURSING NOTE
"Chief Complaint   Patient presents with     Musculoskeletal Problem     hand pain       Initial /80 (BP Location: Right arm, Patient Position: Chair, Cuff Size: Adult Regular)   Pulse 72   Temp 97.9  F (36.6  C) (Tympanic)   Resp 16   Ht 1.702 m (5' 7\")   Wt 84.3 kg (185 lb 12.8 oz)   SpO2 97%   BMI 29.10 kg/m   Estimated body mass index is 29.1 kg/m  as calculated from the following:    Height as of this encounter: 1.702 m (5' 7\").    Weight as of this encounter: 84.3 kg (185 lb 12.8 oz).    Patient presents to the clinic using No DME    Health Maintenance that is potentially due pending provider review:  NONE    Silvia Navarrete MA  3:52 PM 3/2/2020  .        "

## 2020-03-02 NOTE — PROGRESS NOTES
Subjective     James Webb is a 25 year old male who presents to clinic today for the following health issues:    HPI   Musculoskeletal problem/pain      Duration: x 2 days    Description  Location: right hand, over his knuckles. Pinky is worse    Intensity:  mild    Accompanying signs and symptoms: numbness, tingling, swelling and bruising    History  Previous similar problem: no   Previous evaluation:  none    Precipitating or alleviating factors:  Trauma or overuse: YES- punched a hard wall   Aggravating factors include: using hand, unable to make a fist    Therapies tried and outcome: ice        Patient Active Problem List   Diagnosis     Mild intermittent asthma     Alcohol abuse, episodic drinking behavior     Primary insomnia     Anxiety     Past Surgical History:   Procedure Laterality Date     SURGICAL HISTORY OF -   07/1999    PE Tubes       Social History     Tobacco Use     Smoking status: Current Every Day Smoker     Packs/day: 0.50     Years: 5.00     Pack years: 2.50     Types: Cigarettes     Smokeless tobacco: Never Used   Substance Use Topics     Alcohol use: No     Family History   Problem Relation Age of Onset     Hypertension Maternal Grandmother      Cancer - colorectal Other         colon cancer      Respiratory Maternal Aunt      Thyroid Disease Maternal Aunt          Current Outpatient Medications   Medication Sig Dispense Refill     albuterol (PROAIR HFA/PROVENTIL HFA/VENTOLIN HFA) 108 (90 Base) MCG/ACT Inhaler Inhale 2 puffs into the lungs every 6 hours 1 Inhaler 5     sertraline (ZOLOFT) 100 MG tablet Take 1 tablet (100 mg) by mouth daily 30 tablet 3     tiZANidine (ZANAFLEX) 2 MG tablet Take 1 tablet (2 mg) by mouth 3 times daily as needed for muscle spasms or other (neck pain) 30 tablet 1     traZODone (DESYREL) 50 MG tablet Take 1 tablet (50 mg) by mouth At Bedtime 30 tablet 3     No Known Allergies  BP Readings from Last 3 Encounters:   03/02/20 118/80   01/31/20 110/64  "  12/09/19 114/72    Wt Readings from Last 3 Encounters:   03/02/20 84.3 kg (185 lb 12.8 oz)   01/31/20 85.9 kg (189 lb 6.4 oz)   12/09/19 88.5 kg (195 lb)                      Reviewed and updated as needed this visit by Provider         Review of Systems   Constitutional: Negative.    Skin:        Pain to right hand knuckles and healing lacerations            Objective    There were no vitals taken for this visit.  There is no height or weight on file to calculate BMI.  Physical Exam  Vitals signs and nursing note reviewed.   Constitutional:       Appearance: Normal appearance.   Musculoskeletal:         General: Swelling (slight), tenderness (to palpate) and signs of injury present. No deformity.      Comments: Right hand/MCP joint swelling with closed/healing lacerations to 2nd, 3rd and 4th MCP joints   Neurological:      Mental Status: He is alert.            Diagnostic Test Results:  Labs reviewed in Epic        Assessment & Plan     1. Pain of right hand  X-ray ordered. No acute fractures or dislocation noted; will notify patient if radiology read comes back saying otherwise. Discussed OTC recommendations for symptom mgmt. Rest, ice, elevation. Advised against compression as it was keeping inflammation in fingers/knuckle area.     - XR Hand Right G/E 3 Views; Future     BMI:   Estimated body mass index is 29.1 kg/m  as calculated from the following:    Height as of this encounter: 1.702 m (5' 7\").    Weight as of this encounter: 84.3 kg (185 lb 12.8 oz).           See Patient Instructions    Return if symptoms worsen or fail to improve.    SENDY Garcia CNP  [facility]    If a script is needed, aaliyah johnson.  "

## 2020-03-02 NOTE — PATIENT INSTRUCTIONS
Right hand pain:  1. X-ray was negative for any acute fractures. We will call you if the radiologist notices anything different.   2. Pain is likely due to swelling/inflammation.  3. Take ibuprofen 800mg every 8 hours for 1 weeks. Take with food. Take Tylenol 650mg every 6-8 hours as needed for pain.  4. If you develop any upset stomach or GI symptoms, please contact us.  5. Rest, ice/heat, and elevate as able. Leave unwrapped.  6. Follow-up in 2 weeks if symptoms do not improve or sooner if symptoms worsen.

## 2020-03-03 ASSESSMENT — ENCOUNTER SYMPTOMS: CONSTITUTIONAL NEGATIVE: 1

## 2020-03-24 ENCOUNTER — OFFICE VISIT (OUTPATIENT)
Dept: URGENT CARE | Facility: URGENT CARE | Age: 25
End: 2020-03-24
Payer: COMMERCIAL

## 2020-03-24 VITALS
DIASTOLIC BLOOD PRESSURE: 68 MMHG | TEMPERATURE: 97.3 F | SYSTOLIC BLOOD PRESSURE: 110 MMHG | HEIGHT: 67 IN | BODY MASS INDEX: 29 KG/M2 | HEART RATE: 86 BPM | WEIGHT: 184.8 LBS | OXYGEN SATURATION: 98 % | RESPIRATION RATE: 16 BRPM

## 2020-03-24 DIAGNOSIS — K02.9 INFECTED DENTAL CARIES: Primary | ICD-10-CM

## 2020-03-24 DIAGNOSIS — K04.7 INFECTED DENTAL CARIES: Primary | ICD-10-CM

## 2020-03-24 PROCEDURE — 99213 OFFICE O/P EST LOW 20 MIN: CPT | Performed by: FAMILY MEDICINE

## 2020-03-24 RX ORDER — CLINDAMYCIN HCL 300 MG
300 CAPSULE ORAL 3 TIMES DAILY
Qty: 30 CAPSULE | Refills: 1 | Status: SHIPPED | OUTPATIENT
Start: 2020-03-24 | End: 2020-04-02

## 2020-03-24 ASSESSMENT — MIFFLIN-ST. JEOR: SCORE: 1786.88

## 2020-03-24 NOTE — PROGRESS NOTES
"SUBJECTIVE:  Patient presents with tooth pain.  Difficulty getting dental work done.  No fever.    No past medical history on file.  Current Outpatient Medications   Medication Sig Dispense Refill     albuterol (PROAIR HFA/PROVENTIL HFA/VENTOLIN HFA) 108 (90 Base) MCG/ACT Inhaler Inhale 2 puffs into the lungs every 6 hours 1 Inhaler 5     clindamycin (CLEOCIN) 300 MG capsule Take 1 capsule (300 mg) by mouth 3 times daily 30 capsule 1     sertraline (ZOLOFT) 100 MG tablet Take 1 tablet (100 mg) by mouth daily 30 tablet 3     tiZANidine (ZANAFLEX) 2 MG tablet Take 1 tablet (2 mg) by mouth 3 times daily as needed for muscle spasms or other (neck pain) 30 tablet 1     traZODone (DESYREL) 50 MG tablet Take 1 tablet (50 mg) by mouth At Bedtime 30 tablet 3     History   Smoking Status     Current Every Day Smoker     Packs/day: 0.50     Years: 5.00     Types: Cigarettes   Smokeless Tobacco     Never Used         OBJECITVE;  /68 (BP Location: Right arm, Patient Position: Sitting, Cuff Size: Adult Large)   Pulse 86   Temp 97.3  F (36.3  C) (Tympanic)   Resp 16   Ht 1.702 m (5' 7\")   Wt 83.8 kg (184 lb 12.8 oz)   SpO2 98%   BMI 28.94 kg/m    Appears moderately ill but not toxic.  EARS:  normal.  THROAT AND PHARYNX:  Dental caries jerald rt lower molars.  With gingival swelling and tenderness.  NECK: supple; no adenopathy in the neck.  CHEST:  clear.    ASSESSMENT:  Dental infection    PLAN:  Symptomatic therapy suggested: clindamycin, fluids, dental follow up.    Follow up with primary care provider if no improvement.                                                                                "

## 2020-04-02 ENCOUNTER — VIRTUAL VISIT (OUTPATIENT)
Dept: FAMILY MEDICINE | Facility: CLINIC | Age: 25
End: 2020-04-02
Payer: COMMERCIAL

## 2020-04-02 DIAGNOSIS — F10.10 ALCOHOL ABUSE, EPISODIC DRINKING BEHAVIOR: ICD-10-CM

## 2020-04-02 DIAGNOSIS — F90.2 ATTENTION DEFICIT HYPERACTIVITY DISORDER (ADHD), COMBINED TYPE: ICD-10-CM

## 2020-04-02 DIAGNOSIS — F51.01 PRIMARY INSOMNIA: Primary | ICD-10-CM

## 2020-04-02 DIAGNOSIS — F34.1 DYSTHYMIC DISORDER: ICD-10-CM

## 2020-04-02 DIAGNOSIS — F41.0 PANIC ATTACK: ICD-10-CM

## 2020-04-02 PROCEDURE — 99442 ZZC PHYSICIAN TELEPHONE EVALUATION 11-20 MIN: CPT | Performed by: NURSE PRACTITIONER

## 2020-04-02 RX ORDER — HYDROXYZINE HYDROCHLORIDE 25 MG/1
25 TABLET, FILM COATED ORAL EVERY 6 HOURS PRN
Qty: 30 TABLET | Refills: 2 | Status: SHIPPED | OUTPATIENT
Start: 2020-04-02 | End: 2021-10-22

## 2020-04-02 RX ORDER — ESCITALOPRAM OXALATE 20 MG/1
20 TABLET ORAL DAILY
Qty: 30 TABLET | Refills: 5 | Status: SHIPPED | OUTPATIENT
Start: 2020-04-02 | End: 2021-10-22

## 2020-04-02 RX ORDER — GUANFACINE 1 MG/1
1 TABLET ORAL AT BEDTIME
Qty: 30 TABLET | Refills: 2 | Status: SHIPPED | OUTPATIENT
Start: 2020-04-02 | End: 2021-10-22

## 2020-04-02 ASSESSMENT — ANXIETY QUESTIONNAIRES
5. BEING SO RESTLESS THAT IT IS HARD TO SIT STILL: SEVERAL DAYS
1. FEELING NERVOUS, ANXIOUS, OR ON EDGE: SEVERAL DAYS
GAD7 TOTAL SCORE: 12
2. NOT BEING ABLE TO STOP OR CONTROL WORRYING: NEARLY EVERY DAY
7. FEELING AFRAID AS IF SOMETHING AWFUL MIGHT HAPPEN: MORE THAN HALF THE DAYS
3. WORRYING TOO MUCH ABOUT DIFFERENT THINGS: NEARLY EVERY DAY
6. BECOMING EASILY ANNOYED OR IRRITABLE: SEVERAL DAYS

## 2020-04-02 ASSESSMENT — PATIENT HEALTH QUESTIONNAIRE - PHQ9
5. POOR APPETITE OR OVEREATING: SEVERAL DAYS
SUM OF ALL RESPONSES TO PHQ QUESTIONS 1-9: 14

## 2020-04-02 NOTE — PROGRESS NOTES
"Subjective     James Webb is a 25 year old male who is being evaluated via a billable telephone visit.      The patient has been notified of following:     \"This telephone visit will be conducted via a call between you and your physician/provider. We have found that certain health care needs can be provided without the need for a physical exam.  This service lets us provide the care you need with a short phone conversation.  If a prescription is necessary we can send it directly to your pharmacy.  If lab work is needed we can place an order for that and you can then stop by our lab to have the test done at a later time.    If during the course of the call the physician/provider feels a telephone visit is not appropriate, you will not be charged for this service.\"     Patient has given verbal consent for Telephone visit?  Yes    James Webb complains of   Chief Complaint   Patient presents with     Recheck Medication     Zoloft       ALLERGIES  Patient has no known allergies.    Anxiety Follow-Up    How are you doing with your anxiety since your last visit? Worsened since starting on the Sertraline    Are you having other symptoms that might be associated with anxiety? Yes:  Hard to keep up with his thoughts    Have you had a significant life event? Relationship Concerns     Are you feeling depressed? Yes:  no suicidal ideation    Do you have any concerns with your use of alcohol or other drugs? No    Social History     Tobacco Use     Smoking status: Current Every Day Smoker     Packs/day: 0.50     Years: 5.00     Pack years: 2.50     Types: Cigarettes     Smokeless tobacco: Never Used   Substance Use Topics     Alcohol use: No     Drug use: No     VIDA-7 SCORE 6/6/2017 12/9/2019   Total Score 1 8     PHQ 6/6/2017 12/9/2019   PHQ-9 Total Score 2 8   Q9: Thoughts of better off dead/self-harm past 2 weeks Not at all Not at all     Last PHQ-9 4/2/2020   1.  Little interest or pleasure in doing things 1   2. "  Feeling down, depressed, or hopeless 3   3.  Trouble falling or staying asleep, or sleeping too much 1   4.  Feeling tired or having little energy 0   5.  Poor appetite or overeating 3   6.  Feeling bad about yourself 3   7.  Trouble concentrating 1   8.  Moving slowly or restless 1   Q9: Thoughts of better off dead/self-harm past 2 weeks 1   PHQ-9 Total Score 14     VIDA-7  4/2/2020   1. Feeling nervous, anxious, or on edge 1   2. Not being able to stop or control worrying 3   3. Worrying too much about different things 3   4. Trouble relaxing 1   5. Being so restless that it is hard to sit still 1   6. Becoming easily annoyed or irritable 1   7. Feeling afraid, as if something awful might happen 2   VIDA-7 Total Score 12     H/o ADHD, depression/anxiety, intellectual delays- low IQ per Mom, chronic insomnia since birth.   Mom Stephie has had guardianship for him for the past 2 years.  Nothing on file in our system she will download forms  No ETOH use for 5 weeks. Denies other drug use.   Anxiety and ADHD symptoms exacerbated.   Not able to sit still to watch a movie. More emotional and tearing easily  Has counseling appt set up for next week.    Considers Dr Dela Cruz his Primary Care Provider. Will change in system to reflect that.     -------------------------------------    Patient Active Problem List   Diagnosis     Mild intermittent asthma     Alcohol abuse, episodic drinking behavior     Primary insomnia     Anxiety     Past Surgical History:   Procedure Laterality Date     SURGICAL HISTORY OF -   07/1999    PE Tubes       Social History     Tobacco Use     Smoking status: Current Every Day Smoker     Packs/day: 0.50     Years: 5.00     Pack years: 2.50     Types: Cigarettes     Smokeless tobacco: Never Used   Substance Use Topics     Alcohol use: No     Family History   Problem Relation Age of Onset     Hypertension Maternal Grandmother      Cancer - colorectal Other         colon cancer      Respiratory Maternal  Aunt      Thyroid Disease Maternal Aunt            Reviewed and updated as needed this visit by Provider         Review of Systems   ROS COMP: Constitutional, HEENT, cardiovascular, pulmonary, GI, , musculoskeletal, neuro, skin, endocrine and psych systems are negative, except as otherwise noted.       Objective   Reported vitals:  There were no vitals taken for this visit.   healthy, alert, mild distress and cooperative  Psych: Alert and oriented times 3; coherent speech, normal   rate and volume, able to articulate logical thoughts, able   to abstract reason, no tangential thoughts, no hallucinations   or delusions  His affect is flat     Diagnostic Test Results:  Labs reviewed in Epic        Assessment/Plan:  1. Primary insomnia  Trial tenex. New medication.  - MENTAL HEALTH REFERRAL  - Adult; Psychiatry; Psychiatry; G: [facility]/Bridge to Long-Term Psychiatry as indicated ([phone]); Yes; Several Medications tried and failed; Yes; We will contact you to schedule the a...  - guanFACINE (TENEX) 1 MG tablet; Take 1 tablet (1 mg) by mouth At Bedtime  Dispense: 30 tablet; Refill: 2    2. Alcohol abuse, episodic drinking behavior  SOBER AT THIS TIME   to abstain from ETOH use and work on MH  - MENTAL HEALTH REFERRAL  - Adult; Psychiatry; Psychiatry; G: [facility] to Long-Term Psychiatry as indicated ([phone]); Yes; Several Medications tried and failed; Yes; We will contact you to schedule the a...    3. Depression/Anxiety  Chronic and ongoing.  Sertraline causing more anxiety. Wean off and start lexapro  - MENTAL HEALTH REFERRAL  - Adult; Psychiatry; Psychiatry; G: [facility] to Long-Term Psychiatry as indicated ([phone]); Yes; Several Medications tried and failed; Yes; We will contact you to schedule the a...  - escitalopram (LEXAPRO) 20 MG tablet; Take 1 tablet (20 mg) by mouth daily  Dispense:  30 tablet; Refill: 5    4. Attention deficit hyperactivity disorder (ADHD), combined type  Chronic and ongoing . Medication management recommended.   To discuss treatment options with psychiatry.  - MENTAL HEALTH REFERRAL  - Adult; Psychiatry; Psychiatry; [facility]/Bridge to Long-Term Psychiatry as indicated ([phone]); Yes; Several Medications tried and failed; Yes; We will contact you to schedule the a...  - guanFACINE (TENEX) 1 MG tablet; Take 1 tablet (1 mg) by mouth At Bedtime  Dispense: 30 tablet; Refill: 2    5. Panic attack  New medication.   - hydrOXYzine (ATARAX) 25 MG tablet; Take 1 tablet (25 mg) by mouth every 6 hours as needed for anxiety  Dispense: 30 tablet; Refill: 2    Follow up Phone visit with Dr Dela Cruz recommended. To call and schedule this.     Call or return to the clinic with any worsening of symptoms or no resolution. Patient/Parent verbalized understanding and is in agreement. Medication side effects reviewed.   Current Outpatient Medications   Medication Sig Dispense Refill     albuterol (PROAIR HFA/PROVENTIL HFA/VENTOLIN HFA) 108 (90 Base) MCG/ACT Inhaler Inhale 2 puffs into the lungs every 6 hours 1 Inhaler 5     escitalopram (LEXAPRO) 20 MG tablet Take 1 tablet (20 mg) by mouth daily 30 tablet 5     guanFACINE (TENEX) 1 MG tablet Take 1 tablet (1 mg) by mouth At Bedtime 30 tablet 2     hydrOXYzine (ATARAX) 25 MG tablet Take 1 tablet (25 mg) by mouth every 6 hours as needed for anxiety 30 tablet 2     sertraline (ZOLOFT) 100 MG tablet Take 1 tablet (100 mg) by mouth daily 30 tablet 3     tiZANidine (ZANAFLEX) 2 MG tablet Take 1 tablet (2 mg) by mouth 3 times daily as needed for muscle spasms or other (neck pain) 30 tablet 1     traZODone (DESYREL) 50 MG tablet Take 1 tablet (50 mg) by mouth At Bedtime 30 tablet 3     clindamycin (CLEOCIN) 300 MG capsule Take 1 capsule (300 mg) by mouth 3 times daily (Patient not taking: Reported on 4/2/2020) 30  capsule 1       Phone call duration:  18 minutes    SENDY Morelos CNP

## 2020-04-03 ASSESSMENT — ANXIETY QUESTIONNAIRES: GAD7 TOTAL SCORE: 12

## 2020-04-27 ENCOUNTER — MYC MEDICAL ADVICE (OUTPATIENT)
Dept: FAMILY MEDICINE | Facility: CLINIC | Age: 25
End: 2020-04-27

## 2020-05-05 ENCOUNTER — MEDICAL CORRESPONDENCE (OUTPATIENT)
Dept: HEALTH INFORMATION MANAGEMENT | Facility: CLINIC | Age: 25
End: 2020-05-05

## 2020-05-05 DIAGNOSIS — F41.8 ANXIETY WITH DEPRESSION: Primary | ICD-10-CM

## 2020-05-05 DIAGNOSIS — Z79.899 ENCOUNTER FOR LONG-TERM (CURRENT) USE OF OTHER MEDICATIONS: ICD-10-CM

## 2020-05-08 DIAGNOSIS — Z79.899 ENCOUNTER FOR LONG-TERM (CURRENT) USE OF OTHER MEDICATIONS: ICD-10-CM

## 2020-05-08 DIAGNOSIS — F41.8 ANXIETY WITH DEPRESSION: ICD-10-CM

## 2020-05-08 LAB
ALBUMIN SERPL-MCNC: 4.3 G/DL (ref 3.4–5)
ALP SERPL-CCNC: 69 U/L (ref 40–150)
ALT SERPL W P-5'-P-CCNC: 35 U/L (ref 0–70)
ANION GAP SERPL CALCULATED.3IONS-SCNC: 3 MMOL/L (ref 3–14)
AST SERPL W P-5'-P-CCNC: 19 U/L (ref 0–45)
BILIRUB SERPL-MCNC: 0.4 MG/DL (ref 0.2–1.3)
BUN SERPL-MCNC: 8 MG/DL (ref 7–30)
CALCIUM SERPL-MCNC: 9.4 MG/DL (ref 8.5–10.1)
CHLORIDE SERPL-SCNC: 103 MMOL/L (ref 94–109)
CHOLEST SERPL-MCNC: 161 MG/DL
CO2 SERPL-SCNC: 30 MMOL/L (ref 20–32)
CREAT SERPL-MCNC: 0.7 MG/DL (ref 0.66–1.25)
GFR SERPL CREATININE-BSD FRML MDRD: >90 ML/MIN/{1.73_M2}
GLUCOSE SERPL-MCNC: 88 MG/DL (ref 70–99)
HBA1C MFR BLD: 5 % (ref 0–5.6)
HDLC SERPL-MCNC: 36 MG/DL
LDLC SERPL CALC-MCNC: 84 MG/DL
NONHDLC SERPL-MCNC: 125 MG/DL
POTASSIUM SERPL-SCNC: 4.8 MMOL/L (ref 3.4–5.3)
PROLACTIN SERPL-MCNC: 9 UG/L (ref 2–18)
PROT SERPL-MCNC: 8.4 G/DL (ref 6.8–8.8)
SODIUM SERPL-SCNC: 136 MMOL/L (ref 133–144)
T4 FREE SERPL-MCNC: 1.16 NG/DL (ref 0.76–1.46)
TRIGL SERPL-MCNC: 207 MG/DL
TSH SERPL DL<=0.005 MIU/L-ACNC: 0.68 MU/L (ref 0.4–4)

## 2020-05-08 PROCEDURE — 84443 ASSAY THYROID STIM HORMONE: CPT | Performed by: PHYSICIAN ASSISTANT

## 2020-05-08 PROCEDURE — 83036 HEMOGLOBIN GLYCOSYLATED A1C: CPT | Performed by: PHYSICIAN ASSISTANT

## 2020-05-08 PROCEDURE — 80053 COMPREHEN METABOLIC PANEL: CPT | Performed by: PHYSICIAN ASSISTANT

## 2020-05-08 PROCEDURE — 36415 COLL VENOUS BLD VENIPUNCTURE: CPT | Performed by: PHYSICIAN ASSISTANT

## 2020-05-08 PROCEDURE — 80061 LIPID PANEL: CPT | Performed by: PHYSICIAN ASSISTANT

## 2020-05-08 PROCEDURE — 84146 ASSAY OF PROLACTIN: CPT | Performed by: PHYSICIAN ASSISTANT

## 2020-05-08 PROCEDURE — 84439 ASSAY OF FREE THYROXINE: CPT | Performed by: PHYSICIAN ASSISTANT

## 2020-05-13 ENCOUNTER — OFFICE VISIT (OUTPATIENT)
Dept: URGENT CARE | Facility: URGENT CARE | Age: 25
End: 2020-05-13
Payer: COMMERCIAL

## 2020-05-13 VITALS
SYSTOLIC BLOOD PRESSURE: 124 MMHG | DIASTOLIC BLOOD PRESSURE: 70 MMHG | RESPIRATION RATE: 16 BRPM | BODY MASS INDEX: 29.19 KG/M2 | HEIGHT: 67 IN | HEART RATE: 89 BPM | WEIGHT: 186 LBS | OXYGEN SATURATION: 99 %

## 2020-05-13 DIAGNOSIS — F41.0 PANIC ATTACK: ICD-10-CM

## 2020-05-13 DIAGNOSIS — F43.10 PTSD (POST-TRAUMATIC STRESS DISORDER): ICD-10-CM

## 2020-05-13 DIAGNOSIS — F41.9 ANXIETY: Primary | ICD-10-CM

## 2020-05-13 PROCEDURE — 99214 OFFICE O/P EST MOD 30 MIN: CPT | Performed by: NURSE PRACTITIONER

## 2020-05-13 ASSESSMENT — MIFFLIN-ST. JEOR: SCORE: 1792.32

## 2020-05-13 NOTE — PATIENT INSTRUCTIONS
Continue counseling and medication and understand that this will take time to help.    Small steps add up to be major successful changes.    Be compassionate and kind to yourself    Deep breathing and simple quiet meditation can help    You are feeling edgy and irritable from your loss of sleep    Follow-up with your primary care provider next week and as needed.    Indications for emergent return to emergency department discussed with patient, who verbalized good understanding and agreement.  Patient understands the limitations of today's evaluation.         Patient Education     Your Body s Response to Anxiety    Normal anxiety is part of the body s natural defense system. It's an alert to a threat that is unknown, vague, or comes from your own internal fears. While you re in this state, your feelings can range from a vague sense of worry to physical sensations such as a pounding heartbeat. These feelings make you want to react to the threat. An anxiety response is normal in many situations. But when you have an anxiety disorder, the same response can occur at the wrong times.  Anxiety can be helpful  Normal anxiety is a signal from your brain that warns you of a threat and is a normal response to help you prevent something or decrease the bad effects of something you can't control. For example, anxiety is a normal response to situations that might damage your body, separate you from a loved one, or lose your job. The symptoms of anxiety can be physical and mental.  How does it feel?  At certain times, people with anxiety may have:    Dizziness    Muscle tension or pain    Restlessness    Sleeplessness    Trouble concentrating    Racing heartbeat    Fast breathing    Shaking or trembling    Stomachache    Diarrhea    Loss of energy    Sweating    Cold, clammy hands    Chest pain    Dry mouth  Anxiety can also be a problem  Anxiety can become a problem when it is hard to control, occurs for months, and interferes  "with important parts of your life. With an anxiety disorder, your body has the response described above, but in inappropriate ways. The response a person has depends on the anxiety disorder he or she has. With some disorders, the anxiety is way out of proportion to the threat that triggers it. With others, anxiety may occur even when there isn t a clear threat or trigger.  Who does it affect?  Some people are more prone to persistent anxiety than others. It tends to run in families, and it affects more younger people than older people, and more women than men. But no age, race, or gender is immune to anxiety problems.  Anxiety can be treated  The good news is that the anxiety that s disrupting your life can be treated. Check with your healthcare provider and rule out any physical problems that may be causing the anxiety symptoms. If an anxiety disorder is diagnosed seek mental healthcare. This is an illness and it can respond to treatment. Most types of anxiety disorders will respond to \"talk therapy\" and medicines. Working with your doctor or other healthcare provider, you can develop skills to help you cope with anxiety. You can also gain the perspective you need to overcome your fears. Note: Good sources of support or guidance can be found at your local hospital, mental health clinic, or an employee assistance program.  How to cope with anxiety  If anxiety is wearing you down, here are some things you can do to cope:    Keep in mind that you can t control everything about a situation. Change what you can and let the rest take its course.    Exercise--it s a great way to relieve tension and help your body feel relaxed.    Avoid caffeine and nicotine, which can make anxiety symptoms worse.    Fight the temptation to turn to alcohol or unprescribed drugs for relief. They only make things worse in the long run.    Educate yourself about anxiety disorders. Keep track of helpful online resources and books you can use " during stressful periods.    Try stress management techniques such as meditation.    Consider online or in-person support groups.   Date Last Reviewed: 1/1/2017 2000-2019 [employer]. [address] [facility], [place] [address]. All rights reserved. This information is not intended as a substitute for professional medical care. Always follow your healthcare professional's instructions.           Patient Education     Anxiety Reaction  Anxiety is the feeling we all get when we think something bad might happen. It is a normal response to stress and usually causes only a mild reaction. When anxiety becomes more severe, it can interfere with daily life. In some cases, you may not even be aware of what it is you re anxious about. There may also be a genetic link or it may be a learned behavior in the home.  Both psychological and physical triggers cause stress reaction. It's often a response to fear or emotional stress, real or imagined. This stress may come from home, family, work, or social relationships.  During an anxiety reaction, you may feel:    Helpless    Nervous    Depressed    Irritable  Your body may show signs of anxiety in many ways. You may experience:    Dry mouth    Shakiness    Dizziness    Weakness    Trouble breathing    Breathing fast (hyperventilating)    Chest pressure    Sweating    Headache    Nausea    Diarrhea    Tiredness    Inability to sleep    Sexual problems  Home care    Try to locate the sources of stress in your life. They may not be obvious. These may include:  ? Daily hassles of life (such as traffic jams, missed appointments, or car troubles)  ? Major life changes, both good (new baby or job promotion) and bad (loss of job or loss of loved one)  ? Overload: feeling that you have too many responsibilities and can't take care of all of them at once  ? Feeling helpless or feeling that your problems are beyond what you re able to solve    Notice how your body reacts to  stress. Learn to listen to your body signals. This will help you take action before the stress becomes severe.    When you can, do something about the source of your stress. (Avoid hassles, limit the amount of change that happens in your life at one time and take a break when you feel overloaded).    Unfortunately, many stressful situations can't be avoided. It is necessary to learn how to better manage stress. There are many proven methods that will reduce your anxiety. These include simple things like exercise, good nutrition, and adequate rest. Also, there are certain techniques that are helpful:  ? Relaxation  ? Breathing exercises  ? Visualization  ? Biofeedback  ? Meditation  For more information about this, consult your healthcare provider or go to a local bookstore and review the many books and tapes available on this subject.  Follow-up care  If you feel that your anxiety is not responding to self-help measures, contact your healthcare provider or make an appointment with a counselor. You may need short-term psychological counseling and temporary medicine to help you manage stress.  Call 911  Call 911 if any of these happen:    Trouble breathing    Confusion    Drowsiness or trouble wakening    Fainting or loss of consciousness    Rapid heart rate    Seizure    New chest pain that becomes more severe, lasts longer, or spreads into your shoulder, arm, neck, jaw, or back  When to seek medical advice  Call your healthcare provider right away if any of these happen:    Your symptoms get worse    Severe headache not relieved by rest and mild pain reliever  Date Last Reviewed: 10/1/2017    7705-3962 [employer]. [address] [place] [address]. All rights reserved. This information is not intended as a substitute for professional medical care. Always follow your healthcare professional's instructions.           Patient Education     Understanding Anxiety Disorders  Almost everyone gets  nervous now and then. It s normal to have knots in your stomach before a test, or for your heart to race on a first date. But an anxiety disorder is much more than a case of nerves. In fact, its symptoms may be overwhelming. But treatment can relieve many of these symptoms. Talking to your healthcare provider is the first step.    What are anxiety disorders?  An anxiety disorder causes intense feelings of panic and fear. These feelings may arise for no apparent reason. And they tend to recur again and again. They may prevent you from coping with life and cause you great distress. As a result, you may avoid anything that triggers your fear. In extreme cases, you may never leave the house. Anxiety disorders may cause other symptoms, such as:    Obsessive thoughts that are unwanted and you can t control    Constant nightmares or painful thoughts of the past    Nausea, sweating, and muscle tension    Trouble sleeping or concentrating  What causes anxiety disorders?  Anxiety disorders tend to run in families. For some people, childhood abuse or neglect may play a role. For others, stressful life events or trauma may trigger anxiety disorders. Anxiety can trigger low self-esteem and poor coping skills.    Panic disorder. This causes an intense fear of being in danger.    Phobias. These are extreme fears of certain objects, places, or events.    Obsessive-compulsive disorder. This causes you to have unwanted thoughts and urges. You also may perform certain actions over and over.    Posttraumatic stress disorder. This occurs in people who have survived a terrible ordeal. It can cause nightmares and flashbacks about the event.    Generalized anxiety disorder. This causes constant worry that can greatly disrupt your life.    Getting better  You may believe that nothing can help you. Or, you might fear what others may think. But most anxiety symptoms can be eased. Having an anxiety disorder is nothing to be ashamed of. Most  people do best with treatment that combines medicine and individual and group therapy. These aren t cures. But they can help you live a healthier life.  Date Last Reviewed: 2/1/2017 2000-2019 [employer]. [address] [facility], [place] [address]. All rights reserved. This information is not intended as a substitute for professional medical care. Always follow your healthcare professional's instructions.           Patient Education     Understanding Post-Traumatic Stress Disorder (PTSD)  You may have post-traumatic stress disorder (PTSD) if you ve been through a traumatic event and are having trouble dealing with it. Such events may include a car crash, rape, domestic violence,  combat, or violent crime. While it is normal to have some anxiety after such an event, it usually goes away in time. But with PTSD, the anxiety is more intense and keeps coming back. And the trauma is relived through nightmares, intrusive memories, and flashbacks (vivid memories that seem real). The symptoms of PTSD can cause problems with relationships and make it hard to cope with daily life. But it can be treated. With help, you can feel better.    How does it feel?  Symptoms of PTSD often start within a few months of the event. Here are some common symptoms:    You startle more easily, and feel anxious and on edge all the time. This can lead to sleep problems. It a can cause you to feel overwhelmed or become angry or upset more easily. Panic attacks (sudden, intense feelings of terror and a strong need to escape from wherever you are) can also occur.    You relive the event through nightmares and flashbacks. During these, you may feel strong emotions and as though you re reliving the event all over again.    You avoid people, places, or activities that remind you of the trauma. You may hold in your feelings and become emotionally numb. It may be hard to concentrate at work or school or to relax with friends. You  may be afraid to let people get close to you.  Who does it affect?  Not everyone who survives a trauma will have PTSD. But many will. In fact, millions of people have the condition. PTSD can happen to anyone, but it most often develops after a person feels his or her or another s life is threatened.  You re at risk for PTSD if you have experienced or witnessed:    A rape or sexual abuse    A mugging or carjacking    A car accident or plane crash    A life-threatening illness    War    Domestic violence    Childhood abuse    Natural disasters such as earthquakes, floods, or hurricanes    The sudden death of a loved one  Finding help  The first step is to talk to a trusted counselor or healthcare provider. He or she can help you take the next step to treatment. This may involve therapy (also called counseling) and medication.  Are you having suicidal thoughts?  You may be feeling helpless, hopeless, and that you can t go on. You may even have thoughts of suicide. But help is available. There are ways to ease this pain and manage the problems in your life.  If you are thinking about harming or killing yourself, please tell your healthcare provider or someone you care about immediately or go to the nearest crisis walk-in center or emergency room.  You can also call, toll-free,    [employer] [facility] ([phone])     [phone] ([phone])   Resources    American Psychiatric Association  785.688.5755  www.healthyminds.org    American Psychological Association  www.apa.org/helpcenter    Anxiety and Depression Association of [place]  www.adaa.org    Mental Health [place]  www.nmha.org    National Center for PTSD  www.ptsd.va.gov    National [employer] of Mental Health  www.nimh.nih.gov/topics/vcuim-glbb-rimr.shtml    National Suicide Prevention Lifeline  718.592.6076 (912-278-XYZT)  www.suicidepreventionlifeline.org  Date Last Reviewed: 2/1/2017 2000-2019 [employer]. [address] [place] [place] [address], [place]  [address]. All rights reserved. This information is not intended as a substitute for professional medical care. Always follow your healthcare professional's instructions.           Patient Education     Panic Attack  A panic attack is an extreme fear reaction that comes on for no clear reason. There is often a fear that something terrible will happen or that you may die. The attack may last a few minutes up to a few hours. Between attacks, things will seem quite normal. This condition has a psychological cause and can be treated with the help of a therapist or psychiatrist. Medicine can be very helpful for this problem.  Panic attacks usually come on suddenly, reaches a peak within minutes, and includes at least 4 of these symptoms:    Palpitations, pounding heart, or accelerated heart rate    Sweating    Chills or heat sensations    Trembling or shaking    Sensations of shortness of breath or smothering    Feelings of choking    Chest pain or discomfort    Nausea or abdominal distress    Feeling dizzy, unsteady, light-headed, or faint    Numbness or tingling sensations    Fear of dying    Fear of going crazy or of losing control    Feelings of unreality, strangeness, or detachment from the environment  Many of these symptoms can be linked to physical problems, so it is sometimes necessary to rule out conditions like thyroid disorders, heart disease, gastrointestinal problems, and others. They can also start as physical symptoms, but psychologically we may react to them in a fearful way, worsening the way we react and feel.  Home care    Try to find the sources of stress in your life. They may not be obvious. These may include:  ? Daily hassles of life which pile up (traffic jams, missed appointments, car troubles).  ? Major life changes, both good (new baby, job promotion) and bad (loss of job, loss of loved one).  ? Feeling that you have too many responsibilities and can't take care of everything at  once.  ? Helplessness: feeling like your problems are too much for you to handle.    Notice how your body reacts to stress. Learn to listen to your body signals so that you can take action before the stress becomes severe.    Try to be aware of what you were doing before the reaction started; this may give you clues to things that can trigger a reaction. It may be situations in your life, or what you were doing at the time.    When possible, avoid or reduce the cause of stress. Avoid hassles, limit the amount of change that is happening in your life at one time or take a break when you feel overloaded.    Unfortunately, you can't stay away from many stressful situations. So you need to learn how to manage stress better. Many proven methods will reduce your anxiety. These include simple things like exercise, good nutrition, and adequate rest. Also, there are certain techniques that are helpful: relaxation and breathing exercises, visualization, biofeedback, meditation, or simply taking time-out to clear your mind. For more information about this, ask your doctor or go to a local bookstore and review the many books and tapes available on this subject.  Follow-up care  Follow-up with your healthcare provider, or as advised.  Call 911  Call 911 if you:    Have suicidal thoughts, a suicide plan, and the means to carry out the plan    Have serious thoughts of hurting someone else    Have trouble breathing    Are very confused    Feel very drowsy or have trouble awakening    Faint or lose consciousness    Have new chest pain that becomes more severe, lasts longer, or spreads into your shoulder, arm, neck, jaw, or back    Have a very rapid or irregular heartbeat    Have a seizure  When to seek medical advice  Call your healthcare provider right away if any of these occur:    Worsening of your symptoms to the point of feeling out-of-control    Feeling that you may try to harm yourself or another    Can't sleep or eat for 3  days in a row    Increased pain with breathing    Increasing feeling of weakness or dizziness    Cough with dark colored sputum (phlegm) or blood    Fever of 100.4 F (38 C) or higher, or as directed by your healthcare provider    Swelling, pain, or redness in one leg    Requests by family or friends for you to seek help for your symptoms  Date Last Reviewed: 3/1/2018    8117-6314 [employer]. [address] [place] [address]. All rights reserved. This information is not intended as a substitute for professional medical care. Always follow your healthcare professional's instructions.

## 2020-05-13 NOTE — PROGRESS NOTES
Subjective     James Webb is a 25 year old male who presents to clinic today for the following health issues:    HPI     Chief Complaint   Patient presents with     Head Injury     had a panic attack last night and banged his head on the floor several times, has headaches,nausea, vomitted, blurred vision, was shaking very bad and was crying very hard, pt said he blacked out, he has PTSD and anxiety      States he has been seeing demons his whole life but has never told anyone until the last 3 counseling sessions he has started. He cannot sleep, he is afraid to go to sleep. Fear of dying. His mother, who is with him, states she looks back and he has been this way all of his life. He needs lights and tv on. Has never slept through the night. Is afraid to be alone. Mom states he is never alone.Did not want to tell me what has caused his PTSD 2 years ago. Mom also states that he is having rectal bleeding everyday even in the shower. Currently going through a rough patch with his girlfriend.    Patient Active Problem List   Diagnosis     Mild intermittent asthma     Alcohol abuse, episodic drinking behavior     Primary insomnia     Anxiety     Past Surgical History:   Procedure Laterality Date     SURGICAL HISTORY OF -   07/1999    PE Tubes       Social History     Tobacco Use     Smoking status: Current Every Day Smoker     Packs/day: 0.50     Years: 5.00     Pack years: 2.50     Types: Cigarettes     Smokeless tobacco: Never Used   Substance Use Topics     Alcohol use: No     Family History   Problem Relation Age of Onset     Hypertension Maternal Grandmother      Cancer - colorectal Other         colon cancer      Respiratory Maternal Aunt      Thyroid Disease Maternal Aunt          Current Outpatient Medications   Medication Sig Dispense Refill     albuterol (PROAIR HFA/PROVENTIL HFA/VENTOLIN HFA) 108 (90 Base) MCG/ACT Inhaler Inhale 2 puffs into the lungs every 6 hours 1 Inhaler 5     escitalopram (LEXAPRO)  "20 MG tablet Take 1 tablet (20 mg) by mouth daily 30 tablet 5     guanFACINE (TENEX) 1 MG tablet Take 1 tablet (1 mg) by mouth At Bedtime 30 tablet 2     hydrOXYzine (ATARAX) 25 MG tablet Take 1 tablet (25 mg) by mouth every 6 hours as needed for anxiety 30 tablet 2     tiZANidine (ZANAFLEX) 2 MG tablet Take 1 tablet (2 mg) by mouth 3 times daily as needed for muscle spasms or other (neck pain) 30 tablet 1     No Known Allergies      Reviewed and updated as needed this visit by Provider  Tobacco  Allergies  Meds  Problems  Med Hx  Surg Hx  Fam Hx         Review of Systems   Constitutional, HEENT, cardiovascular, pulmonary, GI, , musculoskeletal, neuro, skin, endocrine and psych systems are negative, except as otherwise noted.      Objective    /70   Pulse 89   Resp 16   Ht 1.702 m (5' 7\")   Wt 84.4 kg (186 lb)   SpO2 99%   BMI 29.13 kg/m    Body mass index is 29.13 kg/m .  Physical Exam   GENERAL: healthy, alert and no distress, nontoxic in appearance but does not like to make eye contact. Mother is with him. He lives with her. He is angry and snappy toward his mother. He states all he hears is counseling, medication. He feels no one is helping him. He asked his mother permission if he could tell me about seeing demons and being chained up by them. She said of course to share his thoughts.  EYES: Eyes grossly normal to inspection, PERRL and conjunctivae and sclerae normal  HENT: ear canals and TM's normal, nose and mouth without ulcers or lesions, no trauma to forehead where he banged his head on floor last night  NECK: no adenopathy, supple with full ROM  RESP: lungs clear to auscultation - no rales, rhonchi or wheezes  CV: regular rate and rhythm, normal S1 S2, no S3 or S4, no murmur, click or rub, no peripheral edema   ABDOMEN: soft, nontender  MS: no gross musculoskeletal defects noted, no edema  CN 2-12 intact    Diagnostic Test Results:  Labs reviewed in Epic  No results found for this or " "any previous visit (from the past 24 hour(s)).        Assessment & Plan  James has had life long issues with anxiety and stress. He is going to his fourth ever counseling session tomorrow. They are attempting to find the right medication and considering EMDR therapy. He is being seen at [facility]. He is not suicidal and not homicidal. He is edgy and irritated but in control. I belief a large part of this could be from sleep deprivation. Mother states he never sleeps through the night. Needs to not be alone and needs lights and tv on. He is afraid to go to sleep because of the demons that chain him up and he is afraid of dying. Mother states she is wondering if he has had something going on since early childhood as he has always been anxious and unable to sleep. I spent approximately 30 minutes face to face with James and his mom encouraging him and trying to see if he could be more gentle with himself. He is angry about PTSD, again, I am not sure from what. Mother also states he is having rectal bleeding even in the shower. We discussed this needing further evaluation but he was not ready for any of that today. She will see if she can set him up with a PCP, maybe male, as he may be more comfortable with that.   Problem List Items Addressed This Visit     Anxiety - Primary      Other Visit Diagnoses     PTSD (post-traumatic stress disorder)        Panic attack                 BMI:   Estimated body mass index is 29.13 kg/m  as calculated from the following:    Height as of this encounter: 1.702 m (5' 7\").    Weight as of this encounter: 84.4 kg (186 lb).   Weight management plan: Patient was referred to their PCP to discuss a diet and exercise plan.        Patient Instructions     Continue counseling and medication and understand that this will take time to help.    Small steps add up to be major successful changes.    Be compassionate and kind to yourself    Deep breathing and simple quiet meditation " can help    You are feeling edgy and irritable from your loss of sleep    Follow-up with your primary care provider next week and as needed.    Indications for emergent return to emergency department discussed with patient, who verbalized good understanding and agreement.  Patient understands the limitations of today's evaluation.         Patient Education     Your Body s Response to Anxiety    Normal anxiety is part of the body s natural defense system. It's an alert to a threat that is unknown, vague, or comes from your own internal fears. While you re in this state, your feelings can range from a vague sense of worry to physical sensations such as a pounding heartbeat. These feelings make you want to react to the threat. An anxiety response is normal in many situations. But when you have an anxiety disorder, the same response can occur at the wrong times.  Anxiety can be helpful  Normal anxiety is a signal from your brain that warns you of a threat and is a normal response to help you prevent something or decrease the bad effects of something you can't control. For example, anxiety is a normal response to situations that might damage your body, separate you from a loved one, or lose your job. The symptoms of anxiety can be physical and mental.  How does it feel?  At certain times, people with anxiety may have:    Dizziness    Muscle tension or pain    Restlessness    Sleeplessness    Trouble concentrating    Racing heartbeat    Fast breathing    Shaking or trembling    Stomachache    Diarrhea    Loss of energy    Sweating    Cold, clammy hands    Chest pain    Dry mouth  Anxiety can also be a problem  Anxiety can become a problem when it is hard to control, occurs for months, and interferes with important parts of your life. With an anxiety disorder, your body has the response described above, but in inappropriate ways. The response a person has depends on the anxiety disorder he or she has. With some disorders,  "the anxiety is way out of proportion to the threat that triggers it. With others, anxiety may occur even when there isn t a clear threat or trigger.  Who does it affect?  Some people are more prone to persistent anxiety than others. It tends to run in families, and it affects more younger people than older people, and more women than men. But no age, race, or gender is immune to anxiety problems.  Anxiety can be treated  The good news is that the anxiety that s disrupting your life can be treated. Check with your healthcare provider and rule out any physical problems that may be causing the anxiety symptoms. If an anxiety disorder is diagnosed seek mental healthcare. This is an illness and it can respond to treatment. Most types of anxiety disorders will respond to \"talk therapy\" and medicines. Working with your doctor or other healthcare provider, you can develop skills to help you cope with anxiety. You can also gain the perspective you need to overcome your fears. Note: Good sources of support or guidance can be found at your local hospital, mental health clinic, or an employee assistance program.  How to cope with anxiety  If anxiety is wearing you down, here are some things you can do to cope:    Keep in mind that you can t control everything about a situation. Change what you can and let the rest take its course.    Exercise--it s a great way to relieve tension and help your body feel relaxed.    Avoid caffeine and nicotine, which can make anxiety symptoms worse.    Fight the temptation to turn to alcohol or unprescribed drugs for relief. They only make things worse in the long run.    Educate yourself about anxiety disorders. Keep track of helpful online resources and books you can use during stressful periods.    Try stress management techniques such as meditation.    Consider online or in-person support groups.   Date Last Reviewed: 1/1/2017 2000-2019 [employer]. [address], " [place] [address]. All rights reserved. This information is not intended as a substitute for professional medical care. Always follow your healthcare professional's instructions.           Patient Education     Anxiety Reaction  Anxiety is the feeling we all get when we think something bad might happen. It is a normal response to stress and usually causes only a mild reaction. When anxiety becomes more severe, it can interfere with daily life. In some cases, you may not even be aware of what it is you re anxious about. There may also be a genetic link or it may be a learned behavior in the home.  Both psychological and physical triggers cause stress reaction. It's often a response to fear or emotional stress, real or imagined. This stress may come from home, family, work, or social relationships.  During an anxiety reaction, you may feel:    Helpless    Nervous    Depressed    Irritable  Your body may show signs of anxiety in many ways. You may experience:    Dry mouth    Shakiness    Dizziness    Weakness    Trouble breathing    Breathing fast (hyperventilating)    Chest pressure    Sweating    Headache    Nausea    Diarrhea    Tiredness    Inability to sleep    Sexual problems  Home care    Try to locate the sources of stress in your life. They may not be obvious. These may include:  ? Daily hassles of life (such as traffic jams, missed appointments, or car troubles)  ? Major life changes, both good (new baby or job promotion) and bad (loss of job or loss of loved one)  ? Overload: feeling that you have too many responsibilities and can't take care of all of them at once  ? Feeling helpless or feeling that your problems are beyond what you re able to solve    Notice how your body reacts to stress. Learn to listen to your body signals. This will help you take action before the stress becomes severe.    When you can, do something about the source of your stress. (Avoid hassles, limit the amount of change that  happens in your life at one time and take a break when you feel overloaded).    Unfortunately, many stressful situations can't be avoided. It is necessary to learn how to better manage stress. There are many proven methods that will reduce your anxiety. These include simple things like exercise, good nutrition, and adequate rest. Also, there are certain techniques that are helpful:  ? Relaxation  ? Breathing exercises  ? Visualization  ? Biofeedback  ? Meditation  For more information about this, consult your healthcare provider or go to a local bookstore and review the many books and tapes available on this subject.  Follow-up care  If you feel that your anxiety is not responding to self-help measures, contact your healthcare provider or make an appointment with a counselor. You may need short-term psychological counseling and temporary medicine to help you manage stress.  Call 911  Call 911 if any of these happen:    Trouble breathing    Confusion    Drowsiness or trouble wakening    Fainting or loss of consciousness    Rapid heart rate    Seizure    New chest pain that becomes more severe, lasts longer, or spreads into your shoulder, arm, neck, jaw, or back  When to seek medical advice  Call your healthcare provider right away if any of these happen:    Your symptoms get worse    Severe headache not relieved by rest and mild pain reliever  Date Last Reviewed: 10/1/2017    3822-8614 [employer]. [address] [place] [address], [place] [id number]. All rights reserved. This information is not intended as a substitute for professional medical care. Always follow your healthcare professional's instructions.           Patient Education     Understanding Anxiety Disorders  Almost everyone gets nervous now and then. It s normal to have knots in your stomach before a test, or for your heart to race on a first date. But an anxiety disorder is much more than a case of nerves. In fact, its symptoms may be overwhelming.  But treatment can relieve many of these symptoms. Talking to your healthcare provider is the first step.    What are anxiety disorders?  An anxiety disorder causes intense feelings of panic and fear. These feelings may arise for no apparent reason. And they tend to recur again and again. They may prevent you from coping with life and cause you great distress. As a result, you may avoid anything that triggers your fear. In extreme cases, you may never leave the house. Anxiety disorders may cause other symptoms, such as:    Obsessive thoughts that are unwanted and you can t control    Constant nightmares or painful thoughts of the past    Nausea, sweating, and muscle tension    Trouble sleeping or concentrating  What causes anxiety disorders?  Anxiety disorders tend to run in families. For some people, childhood abuse or neglect may play a role. For others, stressful life events or trauma may trigger anxiety disorders. Anxiety can trigger low self-esteem and poor coping skills.    Panic disorder. This causes an intense fear of being in danger.    Phobias. These are extreme fears of certain objects, places, or events.    Obsessive-compulsive disorder. This causes you to have unwanted thoughts and urges. You also may perform certain actions over and over.    Posttraumatic stress disorder. This occurs in people who have survived a terrible ordeal. It can cause nightmares and flashbacks about the event.    Generalized anxiety disorder. This causes constant worry that can greatly disrupt your life.    Getting better  You may believe that nothing can help you. Or, you might fear what others may think. But most anxiety symptoms can be eased. Having an anxiety disorder is nothing to be ashamed of. Most people do best with treatment that combines medicine and individual and group therapy. These aren t cures. But they can help you live a healthier life.  Date Last Reviewed: 2/1/2017 2000-2019 [employer] [address]  [place] [address]. All rights reserved. This information is not intended as a substitute for professional medical care. Always follow your healthcare professional's instructions.           Patient Education     Understanding Post-Traumatic Stress Disorder (PTSD)  You may have post-traumatic stress disorder (PTSD) if you ve been through a traumatic event and are having trouble dealing with it. Such events may include a car crash, rape, domestic violence,  combat, or violent crime. While it is normal to have some anxiety after such an event, it usually goes away in time. But with PTSD, the anxiety is more intense and keeps coming back. And the trauma is relived through nightmares, intrusive memories, and flashbacks (vivid memories that seem real). The symptoms of PTSD can cause problems with relationships and make it hard to cope with daily life. But it can be treated. With help, you can feel better.    How does it feel?  Symptoms of PTSD often start within a few months of the event. Here are some common symptoms:    You startle more easily, and feel anxious and on edge all the time. This can lead to sleep problems. It a can cause you to feel overwhelmed or become angry or upset more easily. Panic attacks (sudden, intense feelings of terror and a strong need to escape from wherever you are) can also occur.    You relive the event through nightmares and flashbacks. During these, you may feel strong emotions and as though you re reliving the event all over again.    You avoid people, places, or activities that remind you of the trauma. You may hold in your feelings and become emotionally numb. It may be hard to concentrate at work or school or to relax with friends. You may be afraid to let people get close to you.  Who does it affect?  Not everyone who survives a trauma will have PTSD. But many will. In fact, millions of people have the condition. PTSD can happen to anyone, but it most often  develops after a person feels his or her or another s life is threatened.  You re at risk for PTSD if you have experienced or witnessed:    A rape or sexual abuse    A mugging or carjacking    A car accident or plane crash    A life-threatening illness    War    Domestic violence    Childhood abuse    Natural disasters such as earthquakes, floods, or hurricanes    The sudden death of a loved one  Finding help  The first step is to talk to a trusted counselor or healthcare provider. He or she can help you take the next step to treatment. This may involve therapy (also called counseling) and medication.  Are you having suicidal thoughts?  You may be feeling helpless, hopeless, and that you can t go on. You may even have thoughts of suicide. But help is available. There are ways to ease this pain and manage the problems in your life.  If you are thinking about harming or killing yourself, please tell your healthcare provider or someone you care about immediately or go to the nearest crisis walk-in center or emergency room.  You can also call, toll-free,    [facility] ([phone])     [phone] ([phone])   Resources    American Psychiatric Association  469.260.1319  www.healthyminds.org    American Psychological Association  www.apa.org/helpcenter    Anxiety and Depression Association of [place]  www.adaa.org    Mental Health [place]  www.nmha.org    National Center for PTSD  www.ptsd.va.gov    National [place] of Mental Health  www.nimh.nih.gov/topics/dnlju-bdeg-gkmr.shtml    National Suicide Prevention Lifeline  236.859.1453 (266-983-CYGP)  www.suicidepreventionlifeline.org  Date Last Reviewed: 2/1/2017 2000-2019 [employer]. [address] [place] [address], [place] [address]. All rights reserved. This information is not intended as a substitute for professional medical care. Always follow your healthcare professional's instructions.           Patient Education     Panic Attack  A panic attack is  an extreme fear reaction that comes on for no clear reason. There is often a fear that something terrible will happen or that you may die. The attack may last a few minutes up to a few hours. Between attacks, things will seem quite normal. This condition has a psychological cause and can be treated with the help of a therapist or psychiatrist. Medicine can be very helpful for this problem.  Panic attacks usually come on suddenly, reaches a peak within minutes, and includes at least 4 of these symptoms:    Palpitations, pounding heart, or accelerated heart rate    Sweating    Chills or heat sensations    Trembling or shaking    Sensations of shortness of breath or smothering    Feelings of choking    Chest pain or discomfort    Nausea or abdominal distress    Feeling dizzy, unsteady, light-headed, or faint    Numbness or tingling sensations    Fear of dying    Fear of going crazy or of losing control    Feelings of unreality, strangeness, or detachment from the environment  Many of these symptoms can be linked to physical problems, so it is sometimes necessary to rule out conditions like thyroid disorders, heart disease, gastrointestinal problems, and others. They can also start as physical symptoms, but psychologically we may react to them in a fearful way, worsening the way we react and feel.  Home care    Try to find the sources of stress in your life. They may not be obvious. These may include:  ? Daily hassles of life which pile up (traffic jams, missed appointments, car troubles).  ? Major life changes, both good (new baby, job promotion) and bad (loss of job, loss of loved one).  ? Feeling that you have too many responsibilities and can't take care of everything at once.  ? Helplessness: feeling like your problems are too much for you to handle.    Notice how your body reacts to stress. Learn to listen to your body signals so that you can take action before the stress becomes severe.    Try to be aware of what  you were doing before the reaction started; this may give you clues to things that can trigger a reaction. It may be situations in your life, or what you were doing at the time.    When possible, avoid or reduce the cause of stress. Avoid hassles, limit the amount of change that is happening in your life at one time or take a break when you feel overloaded.    Unfortunately, you can't stay away from many stressful situations. So you need to learn how to manage stress better. Many proven methods will reduce your anxiety. These include simple things like exercise, good nutrition, and adequate rest. Also, there are certain techniques that are helpful: relaxation and breathing exercises, visualization, biofeedback, meditation, or simply taking time-out to clear your mind. For more information about this, ask your doctor or go to a local bookstore and review the many books and tapes available on this subject.  Follow-up care  Follow-up with your healthcare provider, or as advised.  Call 911  Call 911 if you:    Have suicidal thoughts, a suicide plan, and the means to carry out the plan    Have serious thoughts of hurting someone else    Have trouble breathing    Are very confused    Feel very drowsy or have trouble awakening    Faint or lose consciousness    Have new chest pain that becomes more severe, lasts longer, or spreads into your shoulder, arm, neck, jaw, or back    Have a very rapid or irregular heartbeat    Have a seizure  When to seek medical advice  Call your healthcare provider right away if any of these occur:    Worsening of your symptoms to the point of feeling out-of-control    Feeling that you may try to harm yourself or another    Can't sleep or eat for 3 days in a row    Increased pain with breathing    Increasing feeling of weakness or dizziness    Cough with dark colored sputum (phlegm) or blood    Fever of 100.4 F (38 C) or higher, or as directed by your healthcare provider    Swelling, pain, or  redness in one leg    Requests by family or friends for you to seek help for your symptoms  Date Last Reviewed: 3/1/2018    9817-7379 [employer]. [address] [place] [address], [place] [address]. All rights reserved. This information is not intended as a substitute for professional medical care. Always follow your healthcare professional's instructions.             Return in about 1 day (around 5/14/2020) for Follow up with your specialist.  TERRIE Pena  30 minutes spent with this patient greater than 50% in face to face counseling regarding the above.      FLNB SAME DAY PROVIDER  [facility] [facility]

## 2020-06-02 ENCOUNTER — TRANSFERRED RECORDS (OUTPATIENT)
Dept: HEALTH INFORMATION MANAGEMENT | Facility: CLINIC | Age: 25
End: 2020-06-02

## 2020-06-04 LAB — PHQ9 SCORE: 16

## 2020-06-16 ENCOUNTER — TRANSFERRED RECORDS (OUTPATIENT)
Dept: HEALTH INFORMATION MANAGEMENT | Facility: CLINIC | Age: 25
End: 2020-06-16

## 2020-07-22 ENCOUNTER — NURSE TRIAGE (OUTPATIENT)
Dept: NURSING | Facility: CLINIC | Age: 25
End: 2020-07-22

## 2020-07-23 NOTE — TELEPHONE ENCOUNTER
"Patient denies triage, calling to ask if he took Valium can he take Latuda?\" I do not see where he was given either of these medications. Advised to take all medications as directed per MD.  Ashley Lugo RN [facility]      "

## 2020-09-14 ENCOUNTER — TRANSFERRED RECORDS (OUTPATIENT)
Dept: HEALTH INFORMATION MANAGEMENT | Facility: CLINIC | Age: 25
End: 2020-09-14

## 2020-11-16 ENCOUNTER — HEALTH MAINTENANCE LETTER (OUTPATIENT)
Age: 25
End: 2020-11-16

## 2021-01-20 ENCOUNTER — TELEPHONE (OUTPATIENT)
Dept: FAMILY MEDICINE | Facility: CLINIC | Age: 26
End: 2021-01-20

## 2021-01-20 ASSESSMENT — PATIENT HEALTH QUESTIONNAIRE - PHQ9: SUM OF ALL RESPONSES TO PHQ QUESTIONS 1-9: 3

## 2021-01-20 NOTE — TELEPHONE ENCOUNTER
Patient Quality Outreach      Summary:    Patient has the following on his problem list/HM:     Depression / Dysthymia review    6 Month Remission: 4-8 month window range: now  12 Month Remission: 10-14 month window range:        PHQ-9 SCORE 6/6/2017 12/9/2019 4/2/2020   PHQ-9 Total Score 2 8 14       If PHQ-9 recheck is 5 or more, route to provider for next steps.    Asthma review       ACT Total Scores 12/9/2019   ACT TOTAL SCORE -   ASTHMA ER VISITS -   ASTHMA HOSPITALIZATIONS -   ACT TOTAL SCORE (Goal Greater than or Equal to 20) 22   In the past 12 months, how many times did you visit the emergency room for your asthma without being admitted to the hospital? 0   In the past 12 months, how many times were you hospitalized overnight because of your asthma? 0          Patient is due/failing the following:   ACT needed    Type of outreach:    Phone, left message for patient/parent to call back.    Questions for provider review:    None                                                                                                                                     Ginette Morales, [facility]      Chart routed to Care Team.

## 2021-01-20 NOTE — LETTER
My Asthma Action Plan    Name: James Webb   YOB: 1995  Date: 1/20/2021   My doctor: Kiet Dela Cruz MD   My clinic: [facility]        My Rescue Medicine:   Albuterol inhaler (Proair/Ventolin/Proventil HFA)  2-4 puffs EVERY 4 HOURS as needed. Use a spacer if recommended by your provider.   My Asthma Severity:   Intermittent / Exercise Induced  Know your asthma triggers: Patient is unaware of triggers             GREEN ZONE   Good Control    I feel good    No cough or wheeze    Can work, sleep and play without asthma symptoms       Take your asthma control medicine every day.     1. If exercise triggers your asthma, take your rescue medication    15 minutes before exercise or sports, and    During exercise if you have asthma symptoms  2. Spacer to use with inhaler: If you have a spacer, make sure to use it with your inhaler             YELLOW ZONE Getting Worse  I have ANY of these:    I do not feel good    Cough or wheeze    Chest feels tight    Wake up at night   1. Keep taking your Green Zone medications  2. Start taking your rescue medicine:    every 20 minutes for up to 1 hour. Then every 4 hours for 24-48 hours.  3. If you stay in the Yellow Zone for more than 12-24 hours, contact your doctor.  4. If you do not return to the Green Zone in 12-24 hours or you get worse, start taking your oral steroid medicine if prescribed by your provider.           RED ZONE Medical Alert - Get Help  I have ANY of these:    I feel awful    Medicine is not helping    Breathing getting harder    Trouble walking or talking    Nose opens wide to breathe       1. Take your rescue medicine NOW  2. If your provider has prescribed an oral steroid medicine, start taking it NOW  3. Call your doctor NOW  4. If you are still in the Red Zone after 20 minutes and you have not reached your doctor:    Take your rescue medicine again and    Call 911 or go to the emergency room right away    See your  regular doctor within 2 weeks of an Emergency Room or Urgent Care visit for follow-up treatment.          Annual Reminders:  Meet with Asthma Educator,  Flu Shot in the Fall, consider Pneumonia Vaccination for patients with asthma (aged 19 and older).    Pharmacy: [id number] - [facility] [address]    Electronically signed by Kiet Dela Cruz MD   Date: 01/20/21                    Asthma Triggers  How To Control Things That Make Your Asthma Worse    Triggers are things that make your asthma worse.  Look at the list below to help you find your triggers and   what you can do about them. You can help prevent asthma flare-ups by staying away from your triggers.      Trigger                                                          What you can do   Cigarette Smoke  Tobacco smoke can make asthma worse. Do not allow smoking in your home, car or around you.  Be sure no one smokes at a child s day care or school.  If you smoke, ask your health care provider for ways to help you quit.  Ask family members to quit too.  Ask your health care provider for a referral to Quit Plan to help you quit smoking, or call 5-400-862-PLAN.     Colds, Flu, Bronchitis  These are common triggers of asthma. Wash your hands often.  Don t touch your eyes, nose or mouth.  Get a flu shot every year.     Dust Mites  These are tiny bugs that live in cloth or carpet. They are too small to see. Wash sheets and blankets in hot water every week.   Encase pillows and mattress in dust mite proof covers.  Avoid having carpet if you can. If you have carpet, vacuum weekly.   Use a dust mask and HEPA vacuum.   Pollen and Outdoor Mold  Some people are allergic to trees, grass, or weed pollen, or molds. Try to keep your windows closed.  Limit time out doors when pollen count is high.   Ask you health care provider about taking medicine during allergy season.     Animal Dander  Some people are allergic to  skin flakes, urine or saliva from pets with fur or feathers. Keep pets with fur or feathers out of your home.    If you can t keep the pet outdoors, then keep the pet out of your bedroom.  Keep the bedroom door closed.  Keep pets off cloth furniture and away from stuffed toys.     Mice, Rats, and Cockroaches  Some people are allergic to the waste from these pests.   Cover food and garbage.  Clean up spills and food crumbs.  Store grease in the refrigerator.   Keep food out of the bedroom.   Indoor Mold  This can be a trigger if your home has high moisture. Fix leaking faucets, pipes, or other sources of water.   Clean moldy surfaces.  Dehumidify basement if it is damp and smelly.   Smoke, Strong Odors, and Sprays  These can reduce air quality. Stay away from strong odors and sprays, such as perfume, powder, hair spray, paints, smoke incense, paint, cleaning products, candles and new carpet.   Exercise or Sports  Some people with asthma have this trigger. Be active!  Ask your doctor about taking medicine before sports or exercise to prevent symptoms.    Warm up for 5-10 minutes before and after sports or exercise.     Other Triggers of Asthma  Cold air:  Cover your nose and mouth with a scarf.  Sometimes laughing or crying can be a trigger.  Some medicines and food can trigger asthma.

## 2021-01-21 ASSESSMENT — ASTHMA QUESTIONNAIRES: ACT_TOTALSCORE: 22

## 2021-02-04 ENCOUNTER — TELEPHONE (OUTPATIENT)
Dept: FAMILY MEDICINE | Facility: CLINIC | Age: 26
End: 2021-02-04

## 2021-02-04 NOTE — TELEPHONE ENCOUNTER
Patient Quality Outreach      Summary:    Patient has the following on his problem list/HM:     Depression / Dysthymia review    6 Month Remission: 4-8 month window range:   12 Month Remission: 10-14 month window range: now       PHQ-9 SCORE 12/9/2019 4/2/2020 1/20/2021   PHQ-9 Total Score 8 14 3       If PHQ-9 recheck is 5 or more, route to provider for next steps.    Patient is due/failing the following:   PHQ-9 Needed    Type of outreach:    Sent [employer] message.    Questions for provider review:    None                                                                                                                                     Ginette Morales CMA      Chart routed to Care Team.

## 2021-05-12 ENCOUNTER — TELEPHONE (OUTPATIENT)
Dept: FAMILY MEDICINE | Facility: CLINIC | Age: 26
End: 2021-05-12

## 2021-05-12 NOTE — TELEPHONE ENCOUNTER
Patient Quality Outreach      Summary:    Patient has the following on his problem list/HM:     Depression / Dysthymia review    6 Month Remission: 4-8 month window range:   12 Month Remission: 10-14 month window range:        PHQ-9 SCORE 12/9/2019 4/2/2020 1/20/2021   PHQ-9 Total Score 8 14 3       If PHQ-9 recheck is 5 or more, route to provider for next steps.    Patient is due/failing the following:   Depression follow-up visit    Type of outreach:    Phone, left message for patient/parent to call back.   Needs depression/anxiety recheck  Complete PHQ9 when he returns call     Questions for provider review:    None                                                                                                                                     Zainab Velez MA       Chart routed to .

## 2021-09-18 ENCOUNTER — HEALTH MAINTENANCE LETTER (OUTPATIENT)
Age: 26
End: 2021-09-18

## 2021-10-22 ENCOUNTER — E-VISIT (OUTPATIENT)
Dept: FAMILY MEDICINE | Facility: CLINIC | Age: 26
End: 2021-10-22
Payer: COMMERCIAL

## 2021-10-22 DIAGNOSIS — F32.1 CURRENT MODERATE EPISODE OF MAJOR DEPRESSIVE DISORDER WITHOUT PRIOR EPISODE (H): Primary | ICD-10-CM

## 2021-10-22 PROCEDURE — 99421 OL DIG E/M SVC 5-10 MIN: CPT | Performed by: NURSE PRACTITIONER

## 2021-10-22 RX ORDER — VENLAFAXINE HYDROCHLORIDE 75 MG/1
75 CAPSULE, EXTENDED RELEASE ORAL DAILY
Qty: 30 CAPSULE | Refills: 1 | Status: SHIPPED | OUTPATIENT
Start: 2021-10-22 | End: 2021-12-07

## 2021-10-22 ASSESSMENT — ANXIETY QUESTIONNAIRES
4. TROUBLE RELAXING: NEARLY EVERY DAY
6. BECOMING EASILY ANNOYED OR IRRITABLE: NOT AT ALL
2. NOT BEING ABLE TO STOP OR CONTROL WORRYING: NEARLY EVERY DAY
1. FEELING NERVOUS, ANXIOUS, OR ON EDGE: NEARLY EVERY DAY
GAD7 TOTAL SCORE: 16
8. IF YOU CHECKED OFF ANY PROBLEMS, HOW DIFFICULT HAVE THESE MADE IT FOR YOU TO DO YOUR WORK, TAKE CARE OF THINGS AT HOME, OR GET ALONG WITH OTHER PEOPLE?: VERY DIFFICULT
GAD7 TOTAL SCORE: 16
7. FEELING AFRAID AS IF SOMETHING AWFUL MIGHT HAPPEN: NEARLY EVERY DAY
7. FEELING AFRAID AS IF SOMETHING AWFUL MIGHT HAPPEN: NEARLY EVERY DAY
5. BEING SO RESTLESS THAT IT IS HARD TO SIT STILL: SEVERAL DAYS
GAD7 TOTAL SCORE: 16
3. WORRYING TOO MUCH ABOUT DIFFERENT THINGS: NEARLY EVERY DAY

## 2021-10-22 ASSESSMENT — PATIENT HEALTH QUESTIONNAIRE - PHQ9
SUM OF ALL RESPONSES TO PHQ QUESTIONS 1-9: 16
10. IF YOU CHECKED OFF ANY PROBLEMS, HOW DIFFICULT HAVE THESE PROBLEMS MADE IT FOR YOU TO DO YOUR WORK, TAKE CARE OF THINGS AT HOME, OR GET ALONG WITH OTHER PEOPLE: VERY DIFFICULT
SUM OF ALL RESPONSES TO PHQ QUESTIONS 1-9: 16

## 2021-10-22 NOTE — PATIENT INSTRUCTIONS
Thank you for choosing us for your care. I have placed an order for a prescription so that you can start treatment. View your full visit summary for details by clicking on the link below. Your pharmacist will able to address any questions you may have about the medication.      If you're not feeling better within 4-6 weeks please schedule an appointment.  You can schedule an appointment right here in [facility], or call [phone]  If the visit is for the same symptoms as your eVisit, we'll refund the cost of your eVisit if seen within seven days.      Using Antidepressants  Depression is a mood disorder that affects the way you think and feel. The most common symptom is a feeling of deep sadness. This feeling doesn't go away or get better on its own. But most types of depression can be helped with therapy and antidepressant medicines. (Note: This covers antidepressant use in adults only.)     What do antidepressants do?  Antidepressants restore the balance of certain chemicals in your brain to help ease your depression. You will likely feel better in 4 to 6 weeks. But you may continue taking antidepressants for a year or more to keep your symptoms from coming back. Some people with depression need to take antidepressants for life. There are several types of antidepressants. The main types are described below.   Selective serotonin reuptake inhibitors (SSRIs)  SSRIs are effective medicines for the treatment of depression. They tend to have fewer side effects than other antidepressants. Possible side effects include anxiety, trouble sleeping, nausea, diarrhea, sexual dysfunction, and headaches. In rare cases, they may make you more depressed. SSRIs shouldn t be mixed with certain other medicines. Talk with your healthcare provider about all the medicines, herbs, and supplements you are taking.   Tricyclic antidepressants  Tricyclics help severe or long-term depression. They have been used for many years with good  results. Possible side effects include blurred vision, dry mouth, and constipation.   Monoamine oxidase inhibitors (MAOIs)  If you don t respond to tricyclics or SSRIs, your healthcare provider may prescribe MAOIs. These medicines can be very effective. But people taking MAOIs must stay away from certain foods and medicines. Your healthcare provider can tell you more.   Lithium  If you have bipolar disorder, you may take a medicine called lithium. This medicine helps even out your mood. Possible side effects are weight gain, trembling, loose stool, and nausea. Lithium is also used:     For people who have unipolar depression and have not responded to other antidepressants    For people who have a sudden (acute) episode of unipolar depression    As a maintenance medicine to prevent unipolar depression from happening again  Things to stay away from if you are taking MAOIs   If you are taking MAOIs, don t have any of the following:     Beans    Aged cheese    Chocolate    Red wine    Most cold medicines    Certain medicines (ask your healthcare provider)  To reduce the risk of lithium poisoning  You can reduce the risk of lithium poisoning by following this advice:    Take only the prescribed amount of lithium. If your depressive symptoms get worse, contact your healthcare provider. Never increase or decrease your medicine on your own.    Drink plenty of fluids other than coffee, tea, and soda.    Limit salt in your diet.    Before using other prescribed medicines or over-the-counter medicines, check with your pharmacist. This is to be sure the medicines won t interact with the lithium.    Never share your medicines or use another person's medicines, even if it is the same medicine and dose.    Keep follow-up appointments.    Have your lithium blood level checked as advised. You will need blood work more often when symptoms are not under control.  If you have side effects  The side effects of antidepressants are  usually mild. But if you have troubling side effects, call your healthcare provider. Changing the dosage or type of medicine may help. Never stop taking medicines on your own.   [employer] last reviewed this educational content on 9/1/2019 2000-2021 [employer]. All rights reserved. This information is not intended as a substitute for professional medical care. Always follow your healthcare professional's instructions.          Depression: Tips to Help Yourself    As your healthcare providers help treat your depression, you can also help yourself. Keep in mind that your illness affects you emotionally, physically, mentally, and socially. So full recovery will take time. Take care of your body and your soul, and be patient with yourself as you get better.  Self-care    Educate yourself. Read about treatment and medicine options. If you have the energy, attend local conferences or support groups. Keep a list of useful websites and helpful books and use them as needed. This illness is not your fault. Don t blame yourself for your depression.    Manage early symptoms. If you notice symptoms returning, experience triggers, or identify other factors that may lead to a depressive episode, get help as soon as possible. Ask trusted friends and family to monitor your behavior and let you know if they see anything of concern.    Work with your provider. Find a provider you can trust. Communicate honestly with that person and share information on your treatment for depression and your reaction to medicines.    Be prepared for a crisis. Know what to do if you experience a crisis. Keep the phone number of a crisis hotline and know the location of your community's urgent care centers and the closest emergency department.    Hold off on big decisions. Depression can cloud your judgment. So wait until you feel better before making major life decisions, such as changing jobs, moving, or getting  or  .    Be patient. Recovering from depression is a process. Don t be discouraged if it takes some time to feel better.    Keep it simple. Depression saps your energy and concentration. So you won t be able to do all the things you used to do. Set small goals and do what you can.    Be with others. Don t isolate yourself--you ll only feel worse. Try to be with other people. And take part in fun activities when you can. Go to a movie, Yoyogame, Oriental orthodox service, or social event. Talk openly with people you can trust. And accept help when it s offered.    Take care of your body  People with depression often lose the desire to take care of themselves. That only makes their problems worse. During treatment and afterward, make a point to:    Exercise. It s a great way to take care of your body. And studies have shown that exercise helps fight depression. Aim for 30 minutes of moderate activity a day. Walking in small blocks of time (5-10 minutes) is a good way to start, but anything that gets you moving (gardening, house cleaning) counts.    Don't use drugs and alcohol. These may ease the pain in the short term. But they ll only make your problems worse in the long run.    Get relief from stress. Ask your healthcare provider for relaxation exercises and techniques to help relieve stress. Consider activities like meditation, yoga, or Harley Chi.    Eat right. A balanced and healthy diet helps keep your body healthy.    Get adequate sleep. Aim for 8 hours per night. Too much or too little sleep can cause other physical and emotional problems.  [employer] last reviewed this educational content on 12/1/2019 2000-2021 [employer]. All rights reserved. This information is not intended as a substitute for professional medical care. Always follow your healthcare professional's instructions.

## 2021-10-23 ASSESSMENT — ANXIETY QUESTIONNAIRES: GAD7 TOTAL SCORE: 16

## 2021-10-23 ASSESSMENT — PATIENT HEALTH QUESTIONNAIRE - PHQ9: SUM OF ALL RESPONSES TO PHQ QUESTIONS 1-9: 16

## 2021-12-07 ENCOUNTER — OFFICE VISIT (OUTPATIENT)
Dept: FAMILY MEDICINE | Facility: CLINIC | Age: 26
End: 2021-12-07
Payer: COMMERCIAL

## 2021-12-07 VITALS
HEART RATE: 91 BPM | BODY MASS INDEX: 30.67 KG/M2 | TEMPERATURE: 99 F | DIASTOLIC BLOOD PRESSURE: 84 MMHG | RESPIRATION RATE: 18 BRPM | OXYGEN SATURATION: 100 % | SYSTOLIC BLOOD PRESSURE: 118 MMHG | WEIGHT: 195.8 LBS

## 2021-12-07 DIAGNOSIS — F15.11 METHAMPHETAMINE USE DISORDER, MILD, IN SUSTAINED REMISSION (H): ICD-10-CM

## 2021-12-07 DIAGNOSIS — L30.9 ECZEMA, UNSPECIFIED TYPE: Primary | ICD-10-CM

## 2021-12-07 DIAGNOSIS — M25.512 ACUTE PAIN OF LEFT SHOULDER: ICD-10-CM

## 2021-12-07 DIAGNOSIS — F32.1 CURRENT MODERATE EPISODE OF MAJOR DEPRESSIVE DISORDER WITHOUT PRIOR EPISODE (H): ICD-10-CM

## 2021-12-07 PROCEDURE — 99215 OFFICE O/P EST HI 40 MIN: CPT | Performed by: PHYSICIAN ASSISTANT

## 2021-12-07 RX ORDER — TRIAMCINOLONE ACETONIDE 1 MG/G
CREAM TOPICAL 2 TIMES DAILY
Qty: 80 G | Refills: 0 | Status: SHIPPED | OUTPATIENT
Start: 2021-12-07

## 2021-12-07 RX ORDER — VENLAFAXINE HYDROCHLORIDE 75 MG/1
75 CAPSULE, EXTENDED RELEASE ORAL DAILY
Qty: 90 CAPSULE | Refills: 1 | Status: SHIPPED | OUTPATIENT
Start: 2021-12-07 | End: 2021-12-14

## 2021-12-07 ASSESSMENT — PAIN SCALES - GENERAL: PAINLEVEL: MILD PAIN (3)

## 2021-12-07 NOTE — PATIENT INSTRUCTIONS
Use the steroid cream for your rash.     Use Ibuprofen for your arm and chest pain for the next 3-4 days. Stay well hydrated.     There are no longer term affects of your previous substance use. I am really not worried about this for your health.     Continue Effexor and follow-up in 2-3 months for recheck of your depression and anxiety.

## 2021-12-07 NOTE — PROGRESS NOTES
Assessment & Plan   (L30.9) Eczema, unspecified type  (primary encounter diagnosis)  Persistent erythematous rash located on the left lateral hip.  Similar rash on his hand that improved with an over-the-counter steroid cream.  Rash is consistent with some sort of eczema.  Rx for triamcinolone.  Patient will return to clinic as needed if symptoms persist.  - Triamcinolone (KENALOG) 0.1 % external cream          (M25.512) Acute pain of left shoulder  Pt w/ L shoulder pain over the last 2 to 3 days.  Recent increase in exercise due to shoveling lots of snow.  Suspect that this most likely secondary to overuse. Exam revealed tenderness to palpation of the trapezius and pectoralis muscle. FROM. Recommended ice, rest, and alternating between acetaminophen and ibuprofen for pain control. He agreed with the plan.     (F15.11) Methamphetamine use disorder, mild, in sustained remission (H)  History of mild methamphetamine use several years ago.  Last use was 2 years ago.  Patient was concerned that this was going to affect his health long-term.  We discussed that the longer he stays off of any substances the better his body will be.  We discussed proper diet, nutrition, exercise for him.  Reassurance provided.    (F32.1) Current moderate episode of major depressive disorder without prior episode (H)  Doing well so far on venlafaxine.  This was refilled today and will continue this as prescribed.  He will follow-up as needed for any new, concerning or uncontrolled symptoms.  - Venlafaxine (EFFEXOR-XR) 75 MG 24 hr capsule     Tobacco Cessation:   reports that he has been smoking cigarettes. He has a 2.50 pack-year smoking history. He has never used smokeless tobacco.    Return in about 3 months (around 3/7/2022) for In-Clinic Visit, Physical Exam, Medication refill.    I spent a total of 40 minutes on the day of the visit.   Time spent doing chart review, history and exam, documentation and further activities per the  note    JACQUELYN Viveros [facility]    Darron Ramirez is a 26 year old who presents for the following health issues     Pt presents with L shoulder pain, forearm pain, and intermittent chest pain that developed yesterday. Stated that he has been shoveling snow for the past few days. No vision changes, numbness, tingling, or SOB. Describes chest pain as sharp.     Pt with h/o asthma who presents with rash on L flank that developed 1 week ago. Endorses mild itching. Denies recent camping trips or travel. No fever, fatigue. Tried eczema cream without improvement. No new cream, detergent, or lotion.     HPI   Rash  Onset/Duration: About a week   Description  Location: Left Side waist area   Character: red  Itching: no  Intensity:  mild  Progression of Symptoms:  improving  Accompanying signs and symptoms:   Fever: no  Body aches or joint pain: YES- Left arm pain   Sore throat symptoms: no  Recent cold symptoms: no  History:           Previous episodes of similar rash: Yes- says that he had a rash on his hand and feels it could be the same   New exposures:  None  Recent travel: no  Exposure to similar rash: no  Precipitating or alleviating factors: na  Therapies tried and outcome: eczema cream     History of methamphetamine use several years ago.  Last use was 2 years ago.  He says that he only use it several times.  He only smoked it.  He did not inject it.    Review of Systems   See HPI         Objective    /84 (BP Location: Right arm, Patient Position: Sitting, Cuff Size: Adult Large)   Pulse 91   Temp 99  F (37.2  C) (Tympanic)   Resp 18   Wt 88.8 kg (195 lb 12.8 oz)   SpO2 100%   BMI 30.67 kg/m    Body mass index is 30.67 kg/m .  Physical Exam   GENERAL: healthy, alert and no distress  EYES: Eyes grossly normal to inspection, PERRL and conjunctivae and sclerae normal  NECK: no adenopathy, no asymmetry, masses, or scars  RESP: lungs clear to auscultation - no rales,  rhonchi or wheezes  CV: regular rate and rhythm, normal S1 S2, no S3 or S4, no murmur, click or rub, no peripheral edema and peripheral pulses strong  MS: no gross musculoskeletal defects noted, no edema. FROM of L shoulder. Negative drop arm and empty can test. Pain with tenderness of palpation to the trapezius and pectoralis muscle.   SKIN: Grouped, erythematous papules on L flank   NEURO: Normal strength and tone, mentation intact and speech normal  PSYCH: mentation appears normal, affect normal/bright    Physician Attestation   I, Xander Walker, was present with the medical/HAKEEM student who participated in the service and in the documentation of the note.  I have verified the history and personally performed the physical exam and medical decision making.  I agree with the assessment and plan of care as documented in the note.      Xander Walker PA-C

## 2021-12-13 ENCOUNTER — OFFICE VISIT (OUTPATIENT)
Dept: URGENT CARE | Facility: URGENT CARE | Age: 26
End: 2021-12-13
Payer: COMMERCIAL

## 2021-12-13 VITALS
WEIGHT: 199.8 LBS | TEMPERATURE: 98.5 F | SYSTOLIC BLOOD PRESSURE: 124 MMHG | DIASTOLIC BLOOD PRESSURE: 84 MMHG | RESPIRATION RATE: 16 BRPM | HEART RATE: 92 BPM | BODY MASS INDEX: 31.29 KG/M2 | OXYGEN SATURATION: 100 %

## 2021-12-13 DIAGNOSIS — R07.9 ACUTE CHEST PAIN: ICD-10-CM

## 2021-12-13 DIAGNOSIS — R07.89 CHEST WALL PAIN: Primary | ICD-10-CM

## 2021-12-13 DIAGNOSIS — F41.9 ANXIETY: ICD-10-CM

## 2021-12-13 PROCEDURE — 93000 ELECTROCARDIOGRAM COMPLETE: CPT | Performed by: PHYSICIAN ASSISTANT

## 2021-12-13 PROCEDURE — 99215 OFFICE O/P EST HI 40 MIN: CPT | Mod: 25 | Performed by: PHYSICIAN ASSISTANT

## 2021-12-13 RX ORDER — HYDROXYZINE PAMOATE 50 MG/1
50 CAPSULE ORAL 3 TIMES DAILY PRN
Qty: 30 CAPSULE | Refills: 0 | Status: SHIPPED | OUTPATIENT
Start: 2021-12-13 | End: 2022-04-07

## 2021-12-13 RX ORDER — NAPROXEN 500 MG/1
500 TABLET ORAL 2 TIMES DAILY WITH MEALS
Qty: 60 TABLET | Refills: 0 | Status: SHIPPED | OUTPATIENT
Start: 2021-12-13 | End: 2022-01-13

## 2021-12-14 ENCOUNTER — OFFICE VISIT (OUTPATIENT)
Dept: FAMILY MEDICINE | Facility: CLINIC | Age: 26
End: 2021-12-14
Payer: COMMERCIAL

## 2021-12-14 VITALS
BODY MASS INDEX: 31.67 KG/M2 | SYSTOLIC BLOOD PRESSURE: 130 MMHG | DIASTOLIC BLOOD PRESSURE: 84 MMHG | HEART RATE: 90 BPM | TEMPERATURE: 99.2 F | RESPIRATION RATE: 18 BRPM | OXYGEN SATURATION: 99 % | WEIGHT: 202.2 LBS

## 2021-12-14 DIAGNOSIS — J45.20 MILD INTERMITTENT ASTHMA WITHOUT COMPLICATION: ICD-10-CM

## 2021-12-14 DIAGNOSIS — F32.1 CURRENT MODERATE EPISODE OF MAJOR DEPRESSIVE DISORDER WITHOUT PRIOR EPISODE (H): ICD-10-CM

## 2021-12-14 DIAGNOSIS — R07.9 CHEST PAIN, UNSPECIFIED TYPE: Primary | ICD-10-CM

## 2021-12-14 LAB — TSH SERPL DL<=0.005 MIU/L-ACNC: 0.52 MU/L (ref 0.4–4)

## 2021-12-14 PROCEDURE — 36415 COLL VENOUS BLD VENIPUNCTURE: CPT | Performed by: PHYSICIAN ASSISTANT

## 2021-12-14 PROCEDURE — 84443 ASSAY THYROID STIM HORMONE: CPT | Performed by: PHYSICIAN ASSISTANT

## 2021-12-14 PROCEDURE — 99214 OFFICE O/P EST MOD 30 MIN: CPT | Performed by: PHYSICIAN ASSISTANT

## 2021-12-14 RX ORDER — VENLAFAXINE HYDROCHLORIDE 75 MG/1
150 CAPSULE, EXTENDED RELEASE ORAL DAILY
Qty: 180 CAPSULE | Refills: 1 | Status: SHIPPED | OUTPATIENT
Start: 2021-12-14 | End: 2022-03-22

## 2021-12-14 RX ORDER — ALBUTEROL SULFATE 90 UG/1
2 AEROSOL, METERED RESPIRATORY (INHALATION) EVERY 6 HOURS
Qty: 18 G | Refills: 1 | Status: SHIPPED | OUTPATIENT
Start: 2021-12-14 | End: 2022-04-07

## 2021-12-14 NOTE — PROGRESS NOTES
Assessment & Plan   Chest pain, unspecified type  Patient reports intermittent chest pain that worsens with lying flat.  There are no associated symptoms.  Chest pain last for seconds and then resolves on its own.  No association with exertion.  Patient was seen in urgent care yesterday with a negative EKG.  Upon further questioning the patient is very anxious.  He thinks that his previous methamphetamine use several years ago is causing issues in his body. Reassurance given. Suspect his chest pain is due to his anxiety. We did increase his Effexor as below.  Warning signs and symptoms discussed on when to return to clinic or go to the ER. Pt verbalized understanding.      Current moderate episode of major depressive disorder without prior episode (H)  Patient with lots of anxiety.  Currently on Effexor which will increase to 150 mg/day.  I also encouraged him to start counseling to help him through some of these acute anxiety attacks.  TSH ordered to ensure no thyroid disease but again I do not suspect this to be the case.  Follow-up in 1 month for recheck  - venlafaxine (EFFEXOR-XR) 75 MG 24 hr capsule; Take 2 capsules (150 mg) by mouth daily  - TSH with free T4 reflex; Future  - Adult Mental Health Referral; Future    Mild intermittent asthma without complication  Stable.  No exacerbation.  Lungs are clear on exam.  Albuterol refilled today.  - albuterol (PROAIR HFA/PROVENTIL HFA/VENTOLIN HFA) 108 (90 Base) MCG/ACT inhaler; Inhale 2 puffs into the lungs every 6 hours    Tobacco Cessation:   reports that he has been smoking cigarettes. He has a 2.50 pack-year smoking history. He has never used smokeless tobacco.    Return in about 4 weeks (around 1/11/2022) for In-Clinic Visit.    Xander Walker PA-C  [facility]   James is a 26 year old who presents for the following health issues     HPI   Urgent Care follow up   -Seen 12/13/2021  -Chest pain     Concern - Weight Gain    Onset: about a month   Description: Patient states that he feels he is rapidly gaining weight   Intensity: moderate  Progression of Symptoms:  worsening  Accompanying Signs & Symptoms: Says that he can't seem to get himself to feel full   Previous history of similar problem: none  Precipitating factors:        Worsened by:   Alleviating factors:        Improved by:   Therapies tried and outcome:  none     Review of Systems   See HPI       Objective    /84 (BP Location: Right arm, Patient Position: Sitting, Cuff Size: Adult Large)   Pulse 90   Temp 99.2  F (37.3  C) (Tympanic)   Resp 18   Wt 91.7 kg (202 lb 3.2 oz)   SpO2 99%   BMI 31.67 kg/m    Body mass index is 31.67 kg/m .  Physical Exam   Constitutional: healthy, alert, and no distress  Head: Normocephalic. Atraumatic  Eyes: No conjunctival injection, sclera anicteric  Neck: supple, no thyromegaly, nodules or asymmetry of the thyroid. No cervical LAD.  Cardiovascular: RRR. No murmurs, clicks, gallops, or rubs. No peripheral edema.   Respiratory: No resp distress. Lungs CTAB bilaterally.   Musculoskeletal: extremities normal- no gross deformities noted, and normal muscle tone  Skin: no suspicious lesions or rashes  Neurologic: Gait normal. CN 2-12 grossly intact  Psychiatric: mentation appears normal and affect normal/bright

## 2021-12-14 NOTE — PROGRESS NOTES
Assessment & Plan     Chest wall pain  Will treat with naproxen (NAPROSYN) 500 MG tablet; Take 1 tablet (500 mg) by mouth 2 times daily (with meals). Avoid aggravating factors but encouraged gentle stretching/ROM. Return to clinic if symptoms worsen or do not improve; otherwise follow up as needed      Acute chest pain  Reassurance, normal EKG. Discussed in detail symptoms that would warrant emergent evaluation in the ED. Patient agrees with plan and will follow up as needed.      - EKG 12-lead complete w/read - Clinics  - EKG 12-lead complete w/read - Clinics    Anxiety  Can start hydrOXYzine (VISTARIL) 50 MG capsule; Take 1 capsule (50 mg) by mouth 3 times daily as needed for anxiety. Would recommend follow up with primary care provider this week. Patient agrees with plan.       40 minutes spent on the date of the encounter doing chart review, history and exam, documentation and further activities per the note       Tobacco Cessation:   reports that he has been smoking cigarettes. He has a 2.50 pack-year smoking history. He has never used smokeless tobacco.          Return in about 1 day (around 12/14/2021) for Follow up.    Leanna Melgar PA-C  [facility] [place]            Subjective   Chief Complaint   Patient presents with     Chest Pain     follow up 12/7, on and off, deep breathing and painful to make certain movement, left arm pain-constant pain       HPI     Cardiac Event    Symptom Onset: 1 week(s) ago.  Timing of illness: sudden onset. After doing push ups and shoveling   Current and Associated symptoms: chest pain, tightness, pain in right back after snowblowing,   Character: sharp, stabbing and tightness.  Severity moderate.  Location: [facility] .  Associated Symptoms: tightness  Exacerbated by: movement.  Relieved by: rest.  Cardiac risk factors: known cardiac disease.              Review of Systems   Constitutional, HEENT, cardiovascular, pulmonary, gi and gu systems  are negative, except as otherwise noted.      Objective    /84 (BP Location: Right arm, Patient Position: Sitting, Cuff Size: Adult Large)   Pulse 92   Temp 98.5  F (36.9  C) (Tympanic)   Resp 16   Wt 90.6 kg (199 lb 12.8 oz)   SpO2 100%   BMI 31.29 kg/m    Body mass index is 31.29 kg/m .  Physical Exam  Constitutional:       General: He is not in acute distress.     Appearance: He is well-developed.   HENT:      Head: Normocephalic and atraumatic.      Right Ear: Tympanic membrane and ear canal normal.      Left Ear: Tympanic membrane and ear canal normal.   Eyes:      Conjunctiva/sclera: Conjunctivae normal.   Cardiovascular:      Rate and Rhythm: Normal rate and regular rhythm.   Pulmonary:      Effort: Pulmonary effort is normal.      Breath sounds: Normal breath sounds.   Chest:      Comments: There is moderate tenderness with palpation of sternum and left chest between ribs.   Skin:     General: Skin is warm and dry.      Findings: No rash.   Psychiatric:         Behavior: Behavior normal.            EKG - Reviewed and interpreted by me appears normal, NSR, normal axis, normal intervals, no acute ST/T changes c/w ischemia, no LVH by voltage criteria, unchanged from previous tracings

## 2021-12-14 NOTE — PATIENT INSTRUCTIONS
Increase Effexor to 150 mg (two tabs) at the same time.     Start counseling.     Try Pepcid over the counter for some heartburn relief.     We will follow-up with results of your thyroid testing.     Follow-up with me in 1 month to see how you are doing.

## 2021-12-20 ENCOUNTER — TELEPHONE (OUTPATIENT)
Dept: FAMILY MEDICINE | Facility: CLINIC | Age: 26
End: 2021-12-20
Payer: COMMERCIAL

## 2021-12-20 NOTE — TELEPHONE ENCOUNTER
Reason for call:  Patient reporting a symptom    Symptom or request: Pt says he was seen by Amrit Walker on 12/14 for some chest pain. He thought his chest pain was anxiety related. He has another question he would like to ask Amrit and asked if he could give him a call. . He didn't want to share his questions with me as he says he was nervous. He says it's personal.        Phone: [phone]    Best Time:  anytime    Can we leave a detailed message on this number:  YES    Call taken on 12/20/2021 at 3:28 PM by Sheyla Drake

## 2021-12-21 ENCOUNTER — APPOINTMENT (OUTPATIENT)
Dept: GENERAL RADIOLOGY | Facility: CLINIC | Age: 26
End: 2021-12-21
Attending: EMERGENCY MEDICINE
Payer: COMMERCIAL

## 2021-12-21 ENCOUNTER — HOSPITAL ENCOUNTER (EMERGENCY)
Facility: CLINIC | Age: 26
Discharge: HOME | End: 2021-12-21
Attending: EMERGENCY MEDICINE | Admitting: EMERGENCY MEDICINE
Payer: COMMERCIAL

## 2021-12-21 VITALS
BODY MASS INDEX: 28.63 KG/M2 | TEMPERATURE: 98.5 F | SYSTOLIC BLOOD PRESSURE: 139 MMHG | DIASTOLIC BLOOD PRESSURE: 81 MMHG | RESPIRATION RATE: 15 BRPM | HEIGHT: 70 IN | OXYGEN SATURATION: 95 % | WEIGHT: 200 LBS | HEART RATE: 94 BPM

## 2021-12-21 DIAGNOSIS — R07.9 CHEST PAIN, UNSPECIFIED TYPE: ICD-10-CM

## 2021-12-21 LAB
ANION GAP SERPL CALCULATED.3IONS-SCNC: 4 MMOL/L (ref 3–14)
BASOPHILS # BLD AUTO: 0.1 10E3/UL (ref 0–0.2)
BASOPHILS NFR BLD AUTO: 1 %
BUN SERPL-MCNC: 14 MG/DL (ref 7–30)
CALCIUM SERPL-MCNC: 8.8 MG/DL (ref 8.5–10.1)
CHLORIDE BLD-SCNC: 108 MMOL/L (ref 94–109)
CO2 SERPL-SCNC: 29 MMOL/L (ref 20–32)
CREAT SERPL-MCNC: 0.69 MG/DL (ref 0.66–1.25)
EOSINOPHIL # BLD AUTO: 0.2 10E3/UL (ref 0–0.7)
EOSINOPHIL NFR BLD AUTO: 2 %
ERYTHROCYTE [DISTWIDTH] IN BLOOD BY AUTOMATED COUNT: 13.2 % (ref 10–15)
GFR SERPL CREATININE-BSD FRML MDRD: >90 ML/MIN/1.73M2
GLUCOSE BLD-MCNC: 97 MG/DL (ref 70–99)
HCT VFR BLD AUTO: 45.2 % (ref 40–53)
HGB BLD-MCNC: 15.3 G/DL (ref 13.3–17.7)
HOLD SPECIMEN: NORMAL
HOLD SPECIMEN: NORMAL
IMM GRANULOCYTES # BLD: 0.1 10E3/UL
IMM GRANULOCYTES NFR BLD: 1 %
LYMPHOCYTES # BLD AUTO: 1.8 10E3/UL (ref 0.8–5.3)
LYMPHOCYTES NFR BLD AUTO: 21 %
MCH RBC QN AUTO: 28.1 PG (ref 26.5–33)
MCHC RBC AUTO-ENTMCNC: 33.8 G/DL (ref 31.5–36.5)
MCV RBC AUTO: 83 FL (ref 78–100)
MONOCYTES # BLD AUTO: 1 10E3/UL (ref 0–1.3)
MONOCYTES NFR BLD AUTO: 11 %
NEUTROPHILS # BLD AUTO: 5.5 10E3/UL (ref 1.6–8.3)
NEUTROPHILS NFR BLD AUTO: 64 %
NRBC # BLD AUTO: 0 10E3/UL
NRBC BLD AUTO-RTO: 0 /100
PLATELET # BLD AUTO: 201 10E3/UL (ref 150–450)
POTASSIUM BLD-SCNC: 4.5 MMOL/L (ref 3.4–5.3)
RBC # BLD AUTO: 5.45 10E6/UL (ref 4.4–5.9)
SODIUM SERPL-SCNC: 141 MMOL/L (ref 133–144)
TROPONIN I SERPL HS-MCNC: 5 NG/L
WBC # BLD AUTO: 8.6 10E3/UL (ref 4–11)

## 2021-12-21 PROCEDURE — 36415 COLL VENOUS BLD VENIPUNCTURE: CPT | Performed by: EMERGENCY MEDICINE

## 2021-12-21 PROCEDURE — 80048 BASIC METABOLIC PNL TOTAL CA: CPT | Performed by: EMERGENCY MEDICINE

## 2021-12-21 PROCEDURE — 85025 COMPLETE CBC W/AUTO DIFF WBC: CPT | Performed by: EMERGENCY MEDICINE

## 2021-12-21 PROCEDURE — 99285 EMERGENCY DEPT VISIT HI MDM: CPT | Mod: 25 | Performed by: EMERGENCY MEDICINE

## 2021-12-21 PROCEDURE — 93308 TTE F-UP OR LMTD: CPT | Mod: 26 | Performed by: EMERGENCY MEDICINE

## 2021-12-21 PROCEDURE — 93010 ELECTROCARDIOGRAM REPORT: CPT | Performed by: EMERGENCY MEDICINE

## 2021-12-21 PROCEDURE — 93005 ELECTROCARDIOGRAM TRACING: CPT | Performed by: EMERGENCY MEDICINE

## 2021-12-21 PROCEDURE — 84484 ASSAY OF TROPONIN QUANT: CPT | Performed by: EMERGENCY MEDICINE

## 2021-12-21 PROCEDURE — 93308 TTE F-UP OR LMTD: CPT | Performed by: EMERGENCY MEDICINE

## 2021-12-21 PROCEDURE — 71046 X-RAY EXAM CHEST 2 VIEWS: CPT

## 2021-12-21 RX ORDER — ACETAMINOPHEN 325 MG/1
2 TABLET ORAL EVERY 6 HOURS PRN
COMMUNITY
Start: 2021-09-24

## 2021-12-21 ASSESSMENT — MIFFLIN-ST. JEOR: SCORE: 1898.44

## 2021-12-21 NOTE — TELEPHONE ENCOUNTER
Talked to pt, he will call back in about 15 minutes, pt instructed ask to talk to Sheyla GARSIA.   Mitzi Dumont RN

## 2021-12-21 NOTE — DISCHARGE INSTRUCTIONS
Your evaluation emergency department was reassuring however no cause of your chest pain is identified.    An order for a cardiac monitor was placed.  Call [phone] to schedule appointment to have the monitor applied.  Follow-up with your primary care provider for results.    If your symptoms worsen or you develop new or concerning symptoms, please return to the Emergency Department for further evaluation and treatment.

## 2021-12-21 NOTE — ED PROVIDER NOTES
"  History     Chief Complaint   Patient presents with     Chest Pain     mid upper chest pain started 12/7/21 with swelling over left chest area.       BANG Webb is a 26 year old male with history of anxiety, depression, mild intermittent asthma, substance abuse, who presents emergency department for evaluation of chest pain.  Pain was located and made from parts of his chest.  He rubs his substernal area and also reports in his left anterior chest both superiorly and inferiorly.  States pain \"feels like chest is being cracked\" and \"sharp\", \"stabbing\" and sometimes interrupts his breath.  Onset approximately 2 weeks ago.  Has 1-2 episodes per day.  Pain can be severe and lasts for 1 to 2 seconds only.  No associated dyspnea, fever, chills, diaphoresis, nausea or vomiting. He as been evaluated for same in clinic and thought to be related to anxiety. He says that he has swelling in his left chest.  He did not think that anxiety can cause swelling in his chest.  Also thought could be from doing recent push-ups or shoveling snow.  Was talking to his mother about this and tried doing push-ups but this did not reproduce his pain.  Patient is  a current smoker. No obvious provocative or palliating features. Sometimes better when lying supine.     The patient's PMHx, Surgical Hx, Allergies, and Medications were all reviewed with the patient.    Allergies:  No Known Allergies    Problem List:    Patient Active Problem List    Diagnosis Date Noted     Methamphetamine use disorder, mild, in sustained remission (H) 12/07/2021     Priority: Medium     Current moderate episode of major depressive disorder without prior episode (H) 12/07/2021     Priority: Medium     Alcohol abuse, episodic drinking behavior 12/09/2019     Priority: Medium     Primary insomnia 12/09/2019     Priority: Medium     Alcohol contributing       Anxiety 12/09/2019     Priority: Medium     Alcohol contributing       Mild intermittent asthma " "12/06/2005     Priority: Medium        Past Medical History:    No past medical history on file.    Past Surgical History:    Past Surgical History:   Procedure Laterality Date     SURGICAL HISTORY OF -   07/1999    PE Tubes       Family History:    Family History   Problem Relation Age of Onset     Hypertension Maternal Grandmother      Cancer - colorectal Other         colon cancer      Respiratory Maternal Aunt      Thyroid Disease Maternal Aunt        Social History:  Marital Status:  Single [1]  Social History     Tobacco Use     Smoking status: Current Every Day Smoker     Packs/day: 0.50     Years: 5.00     Pack years: 2.50     Types: Cigarettes     Smokeless tobacco: Never Used   Substance Use Topics     Alcohol use: No     Drug use: No        Medications:    albuterol (PROAIR HFA/PROVENTIL HFA/VENTOLIN HFA) 108 (90 Base) MCG/ACT inhaler  hydrOXYzine (VISTARIL) 50 MG capsule  naproxen (NAPROSYN) 500 MG tablet  triamcinolone (KENALOG) 0.1 % external cream  venlafaxine (EFFEXOR-XR) 75 MG 24 hr capsule          Review of Systems   A complete review of systems performed and is otherwise negative except as noted in the HPI.     Physical Exam   BP: (!) 140/80  Pulse: (!) 122  Temp: 98.5  F (36.9  C)  Resp: 18  Height: 177.8 cm (5' 10\")  Weight: 90.7 kg (200 lb)  SpO2: 97 %    Physical Exam  GEN: Awake, alert, and cooperative. Appears anxious.   HENT: MMM. External ears and nose normal bilaterally.  EYES: EOM intact. Conjunctiva clear. No discharge.   NECK: Symmetric, freely mobile. No JVD.  No crepitus  CHEST: No tenderness with AP or lateral compression.  No ecchymosis.  No erythema.  CV : Regular rate and rhythm. No murmurs appreciated.   PULM: Normal effort. No wheezes, rales, or rhonchi bilaterally.  ABD: Soft, non-tender, non-distended. No rebound or guarding.   NEURO: Normal speech. Following commands. CN II-XII grossly intact. Answering questions and interacting appropriately.   EXT: No gross deformity. " Warm and well perfused.  INT: Warm. No diaphoresis. Normal color.        ED Course        Procedures  Results for orders placed during the hospital encounter of 12/21/21    POC US ECHO LIMITED    [facility] Procedure Note    Limited Bedside ED Cardiac Ultrasound:    PROCEDURE: PERFORMED BY: Dr. Stepan Pineda MD  INDICATIONS/SYMPTOM:  Chest Pain  PROBE: Cardiac phased array probe  BODY LOCATION: Chest  FINDINGS:  The ultrasound was performed utilizing the subcostal, parasternal long axis, parasternal short axis and apical 4 chamber views.  Cardiac contractility:  Present  Gross estimation of cardiac kinesis: normal  Pericardial Effusion:  None  RV:LV ratio: LV > RV    INTERPRETATION:    Chamber size and motion were grossly normal with LV > RV, normal cardiac kinesis.  No pericardial effusion was found.    IMAGE DOCUMENTATION: Images were archived to PACs system.          EKG: Interpreted myself.  Sinus rhythm with rate of 114 bpm.  Normal axis.  Normal intervals.  No ectopy.  Normal R wave progression.  No ST elevations or depressions.  No significant change compared to ECG from 12/13/2021.  Impression: Sinus tachycardia     Critical Care time:  none               Results for orders placed or performed during the hospital encounter of 12/21/21 (from the past 24 hour(s))   POC US ECHO LIMITED    [facility] Procedure Note      Limited Bedside ED Cardiac Ultrasound:    PROCEDURE: PERFORMED BY: Dr. Stepan Pineda MD  INDICATIONS/SYMPTOM:  Chest Pain  PROBE: Cardiac phased array probe  BODY LOCATION: Chest  FINDINGS:   The ultrasound was performed utilizing the subcostal, parasternal long axis, parasternal short axis and apical 4 chamber views.  Cardiac contractility:  Present  Gross estimation of cardiac kinesis: normal  Pericardial Effusion:  None  RV:LV ratio: LV > RV    INTERPRETATION:    Chamber size and motion were grossly normal with LV > RV, normal cardiac  kinesis.  No pericardial effusion was found.      IMAGE DOCUMENTATION: Images were archived to PACs system.         CBC with platelets differential    Narrative    The following orders were created for panel order CBC with platelets differential.  Procedure                               Abnormality         Status                     ---------                               -----------         ------                     CBC with platelets and d...[[id number]]                      Final result                 Please view results for these tests on the individual orders.   Basic metabolic panel   Result Value Ref Range    Sodium 141 133 - 144 mmol/L    Potassium 4.5 3.4 - 5.3 mmol/L    Chloride 108 94 - 109 mmol/L    Carbon Dioxide (CO2) 29 20 - 32 mmol/L    Anion Gap 4 3 - 14 mmol/L    Urea Nitrogen 14 7 - 30 mg/dL    Creatinine 0.69 0.66 - 1.25 mg/dL    Calcium 8.8 8.5 - 10.1 mg/dL    Glucose 97 70 - 99 mg/dL    GFR Estimate >90 >60 mL/min/1.73m2   Troponin I   Result Value Ref Range    Troponin I High Sensitivity 5 <79 ng/L   [place] Draw    Narrative    The following orders were created for panel order [place] Draw.  Procedure                               Abnormality         Status                     ---------                               -----------         ------                     Extra Blue Top Tube[[id number]]                              Final result               Extra Red Top Tube[[id number]]                               Final result                 Please view results for these tests on the individual orders.   CBC with platelets and differential   Result Value Ref Range    WBC Count 8.6 4.0 - 11.0 10e3/uL    RBC Count 5.45 4.40 - 5.90 10e6/uL    Hemoglobin 15.3 13.3 - 17.7 g/dL    Hematocrit 45.2 40.0 - 53.0 %    MCV 83 78 - 100 fL    MCH 28.1 26.5 - 33.0 pg    MCHC 33.8 31.5 - 36.5 g/dL    RDW 13.2 10.0 - 15.0 %    Platelet Count 201 150 - 450 10e3/uL    % Neutrophils 64 %    % Lymphocytes 21 %    %  Monocytes 11 %    % Eosinophils 2 %    % Basophils 1 %    % Immature Granulocytes 1 %    NRBCs per 100 WBC 0 <1 /100    Absolute Neutrophils 5.5 1.6 - 8.3 10e3/uL    Absolute Lymphocytes 1.8 0.8 - 5.3 10e3/uL    Absolute Monocytes 1.0 0.0 - 1.3 10e3/uL    Absolute Eosinophils 0.2 0.0 - 0.7 10e3/uL    Absolute Basophils 0.1 0.0 - 0.2 10e3/uL    Absolute Immature Granulocytes 0.1 <=0.4 10e3/uL    Absolute NRBCs 0.0 10e3/uL   Extra Blue Top Tube   Result Value Ref Range    Hold Specimen JIC    Extra Red Top Tube   Result Value Ref Range    Hold Specimen JIC    XR Chest 2 Views    Narrative    XR CHEST 2 VW 12/21/2021 2:44 PM    HISTORY: intermittent left sided chest pain. no fever or cough.    COMPARISON: 8/9/2018        Impression    IMPRESSION: No focal infiltrate, pleural effusion or pneumothorax. The  cardiac and mediastinal silhouettes are normal.    TACHO TELLEZ MD         SYSTEM ID:  [id number]       Medications - No data to display    Assessments & Plan (with Medical Decision Making)   26 year old male with past medical history significant for anxiety, depression, substance abuse, intermittent chest pain lasting 1 to 2 seconds over the past 2 weeks.  ECG without evidence of acute ischemia.  No ectopy.  Card troponin not elevated.  Very low suspicion for ACS.  Also be unlikely to have cardiac chest pain lasting only 1 to 2 seconds.  Controlled the PVCs as described by his catching of his breath but not expected to be painful.  Again, no ectopy on ECG.  Could consider outpatient cardiac monitor.  Chart review shows that he does have several visits for this in the past.  Point-of-care ultrasound without pericardial effusion, RV dilatation and he did have grossly preserved LV function.  Chest x-ray without acute infiltrate, effusion, pneumothorax.  No cough or fever to suggest infectious etiology.  Patient minimally distressed, no current pain now, dissection unlikely.  Normal-appearing mediastinum on chest x-ray.   No crepitus on anterior chest or neck, nausea, vomiting, low suspicion for esophageal injury causing his symptoms.    Etiology of his symptoms unclear however given his reassuring work-up I feel he is appropriate for discharge and can follow-up in clinic.  Referral for Zio patch monitor placed.  All results discussed with patient.  Follow-up and ED return precautions discussed.    I have reviewed the nursing notes.         New Prescriptions    No medications on file       Final diagnoses:   Chest pain, unspecified type     Stepan Pineda MD    12/21/2021   [facility] EMERGENCY DEPT    Disclaimer: This note consists of words and symbols derived from keyboarding and dictation using voice recognition software.  As a result, there may be errors that have gone undetected.  Please consider this when interpreting information found in this note.             Stepan Pineda MD  12/21/21 [id number]

## 2021-12-22 ENCOUNTER — TELEPHONE (OUTPATIENT)
Dept: FAMILY MEDICINE | Facility: CLINIC | Age: 26
End: 2021-12-22
Payer: COMMERCIAL

## 2021-12-22 NOTE — TELEPHONE ENCOUNTER
"Pt calls & states he has some questions for his provider. States he had last OV 12/14 & was in the ER 12/21 for CP. States ER mentioned heart monitor. Looks like referral/ordre in place. Pt given # for cardiac services to set up zio patch.   Pt also states he discussed with provider increased anxiety & stress lately & received referral for counseling. Pt given # to call & set up this appt.  Pt also states he has discussed past hx of meth use with provider & was told by provider that \"after you use, it goes through your body & filters out\". States he had a friend tell him that \"when you use it goes to your cartilage & when you crack your neck, back, fingers, it all releases back into your body\". Pt states he has let this information from his friend \"get into his head\" & he is worried about this. explained to pt that he has already discussed this with his provider & he should remember the providers information/advice  when hearing random information from friends. Pt states he understands this & tries to not let information from friends get into his head.  Encouraged pt to make f/u appt with provider for ER f/u & to discuss the above concerns. Pt will discuss with his mother & figure out their schedule & will call back to schedule appt.    Jacqueline Bright RN    "

## 2021-12-23 ENCOUNTER — PATIENT OUTREACH (OUTPATIENT)
Dept: FAMILY MEDICINE | Facility: CLINIC | Age: 26
End: 2021-12-23
Payer: COMMERCIAL

## 2021-12-23 NOTE — TELEPHONE ENCOUNTER
ED / Discharge Outreach Protocol    Patient Contact    Attempt # 1    Was call answered?  No.  Left message on voicemail with information to call me back.  Agustina Mcgarry RN

## 2021-12-27 NOTE — TELEPHONE ENCOUNTER
Hospital/TCU/ED for chronic condition Discharge Protocol    Has follow-up visit scheduled.    Татьяна Madrid RN

## 2022-01-01 NOTE — PATIENT INSTRUCTIONS
Preventive Health Recommendations  Male Ages 18 - 25     Yearly exam:             See your health care provider every year in order to  o   Review health changes.   o   Discuss preventive care.    o   Review your medicines if your doctor has prescribed any.    You should be tested each year for STDs (sexually transmitted diseases).     Talk to your provider about cholesterol testing.      If you are at risk for diabetes, you should have a diabetes test (fasting glucose).    Shots: Get a flu shot each year. Get a tetanus shot every 10 years.     Nutrition:    Eat at least 5 servings of fruits and vegetables daily.     Eat whole-grain bread, whole-wheat pasta and brown rice instead of white grains and rice.     Talk to your provider about calcium and Vitamin D.     Lifestyle    Exercise for at least 150 minutes a week (30 minutes a day, 5 days a week). This will help you control your weight and prevent disease.     Limit alcohol to one drink per day.     No smoking.     Wear sunscreen to prevent skin cancer.     See your dentist every six months for an exam and cleaning.        Problem: Infant Inpatient Plan of Care  Goal: Plan of Care Review  Outcome: Ongoing, Progressing  Flowsheets (Taken 2022 1408)  Progress: improving  Outcome Evaluation:   infant transitioning well   breastfeeding well   vitals stable  Care Plan Reviewed With:   mother   father  Goal: Patient-Specific Goal (Individualized)  Outcome: Ongoing, Progressing  Goal: Absence of Hospital-Acquired Illness or Injury  Outcome: Ongoing, Progressing  Intervention: Identify and Manage Fall/Drop Risk  Flowsheets (Taken 2022 1408)  Safety Factors:   crib side rails up, wheels locked   ID bands on   ID verified  Intervention: Prevent Skin Injury  Flowsheets (Taken 2022 1408)  Skin Protection (Infant): adhesive use limited  Goal: Optimal Comfort and Wellbeing  Outcome: Ongoing, Progressing  Intervention: Provide Person-Centered Care  Flowsheets (Taken 2022 1408)  Psychosocial Support: care explained to patient/family prior to performing  Goal: Readiness for Transition of Care  Outcome: Ongoing, Progressing   Goal Outcome Evaluation:           Progress: improving  Outcome Evaluation: infant transitioning well; breastfeeding well; vitals stable

## 2022-01-08 ENCOUNTER — HEALTH MAINTENANCE LETTER (OUTPATIENT)
Age: 27
End: 2022-01-08

## 2022-01-13 ENCOUNTER — OFFICE VISIT (OUTPATIENT)
Dept: FAMILY MEDICINE | Facility: CLINIC | Age: 27
End: 2022-01-13
Payer: COMMERCIAL

## 2022-01-13 VITALS
SYSTOLIC BLOOD PRESSURE: 126 MMHG | WEIGHT: 210.2 LBS | RESPIRATION RATE: 20 BRPM | TEMPERATURE: 98.6 F | DIASTOLIC BLOOD PRESSURE: 70 MMHG | BODY MASS INDEX: 30.16 KG/M2 | HEART RATE: 94 BPM

## 2022-01-13 DIAGNOSIS — F41.0 PANIC ATTACK: ICD-10-CM

## 2022-01-13 DIAGNOSIS — F32.1 CURRENT MODERATE EPISODE OF MAJOR DEPRESSIVE DISORDER WITHOUT PRIOR EPISODE (H): Primary | ICD-10-CM

## 2022-01-13 DIAGNOSIS — M54.2 NECK PAIN: ICD-10-CM

## 2022-01-13 PROCEDURE — 99214 OFFICE O/P EST MOD 30 MIN: CPT | Performed by: PHYSICIAN ASSISTANT

## 2022-01-13 RX ORDER — BUPROPION HYDROCHLORIDE 150 MG/1
150 TABLET ORAL EVERY MORNING
Qty: 30 TABLET | Refills: 1 | Status: SHIPPED | OUTPATIENT
Start: 2022-01-13 | End: 2022-01-25

## 2022-01-13 RX ORDER — CYCLOBENZAPRINE HCL 5 MG
5-10 TABLET ORAL
Qty: 30 TABLET | Refills: 1 | Status: SHIPPED | OUTPATIENT
Start: 2022-01-13

## 2022-01-13 ASSESSMENT — PATIENT HEALTH QUESTIONNAIRE - PHQ9
5. POOR APPETITE OR OVEREATING: MORE THAN HALF THE DAYS
SUM OF ALL RESPONSES TO PHQ QUESTIONS 1-9: 12

## 2022-01-13 ASSESSMENT — ANXIETY QUESTIONNAIRES
3. WORRYING TOO MUCH ABOUT DIFFERENT THINGS: SEVERAL DAYS
1. FEELING NERVOUS, ANXIOUS, OR ON EDGE: SEVERAL DAYS
6. BECOMING EASILY ANNOYED OR IRRITABLE: SEVERAL DAYS
GAD7 TOTAL SCORE: 11
2. NOT BEING ABLE TO STOP OR CONTROL WORRYING: MORE THAN HALF THE DAYS
5. BEING SO RESTLESS THAT IT IS HARD TO SIT STILL: SEVERAL DAYS
7. FEELING AFRAID AS IF SOMETHING AWFUL MIGHT HAPPEN: NEARLY EVERY DAY

## 2022-01-13 NOTE — PATIENT INSTRUCTIONS
Continue Effexor. Start Wellbutrin for anxiety.     Start Flexeril for neck pain and sleep. Take this 1-2 hours before bedtime.

## 2022-01-13 NOTE — PROGRESS NOTES
"Assessment & Plan   Current moderate episode of major depressive disorder without prior episode (H)  Patient with worsening depression and anxiety.  Currently on Effexor which had controlled his symptoms in the past.  Shared decision making was discussed.  We will start Wellbutrin 150 mg daily and follow-up in 1 month for recheck.  Patient will work on distraction and stress reduction techniques as well.  - buPROPion (WELLBUTRIN XL) 150 MG 24 hr tablet; Take 1 tablet (150 mg) by mouth every morning    Panic attack  Worsening over the last several months.  Start Wellbutrin as above.  Hopefully this will help with his panic attacks.  He can use Vistaril 3 times a day as needed as well.  - buPROPion (WELLBUTRIN XL) 150 MG 24 hr tablet; Take 1 tablet (150 mg) by mouth every morning    Neck pain  Chronic.  There is a lot of tension in his neck on exam.  Suspect that his neck pain and tension headaches are at least contributed moderately by his anxiety.  While we work on his anxiety I also recommended the patient start home exercises for his neck pain as well as Flexeril.  Patient will follow-up in 1 month and if no improvement will send him for formal physical therapy.  - cyclobenzaprine (FLEXERIL) 5 MG tablet; Take 1-2 tablets (5-10 mg) by mouth nightly as needed for muscle spasms     Tobacco Cessation:   reports that he has been smoking cigarettes. He has a 2.50 pack-year smoking history. He has never used smokeless tobacco.  Tobacco Cessation Action Plan: Information offered: Patient not interested at this time    BMI:   Estimated body mass index is 30.16 kg/m  as calculated from the following:    Height as of 12/21/21: 1.778 m (5' 10\").    Weight as of this encounter: 95.3 kg (210 lb 3.2 oz).     Return in about 4 weeks (around 2/10/2022), or if symptoms worsen or fail to improve, for In-Clinic Visit.    JACQUELYN Viveros [facility]    Darron Ramirez is a 26 year old who presents " for the following health issues     HPI   Depression and Anxiety Follow-Up    How are you doing with your depression since your last visit? Worsened     How are you doing with your anxiety since your last visit?  Worsened    Are you having other symptoms that might be associated with depression or anxiety? No    Have you had a significant life event? No     Do you have any concerns with your use of alcohol or other drugs? No    Social History     Tobacco Use     Smoking status: Current Every Day Smoker     Packs/day: 0.50     Years: 5.00     Pack years: 2.50     Types: Cigarettes     Smokeless tobacco: Never Used   Substance Use Topics     Alcohol use: No     Drug use: No     PHQ 1/20/2021 10/22/2021 1/13/2022   PHQ-9 Total Score 3 16 12   Q9: Thoughts of better off dead/self-harm past 2 weeks Not at all Not at all Not at all     VIDA-7 SCORE 4/2/2020 10/22/2021 1/13/2022   Total Score - 16 (severe anxiety) -   Total Score 12 16 11     Last PHQ-9 1/13/2022   1.  Little interest or pleasure in doing things 2   2.  Feeling down, depressed, or hopeless 2   3.  Trouble falling or staying asleep, or sleeping too much 1   4.  Feeling tired or having little energy 1   5.  Poor appetite or overeating 3   6.  Feeling bad about yourself 1   7.  Trouble concentrating 1   8.  Moving slowly or restless 1   Q9: Thoughts of better off dead/self-harm past 2 weeks 0   PHQ-9 Total Score 12   Difficulty at work, home, or with people -     VIDA-7  1/13/2022   1. Feeling nervous, anxious, or on edge 1   2. Not being able to stop or control worrying 2   3. Worrying too much about different things 1   4. Trouble relaxing 2   5. Being so restless that it is hard to sit still 1   6. Becoming easily annoyed or irritable 1   7. Feeling afraid, as if something awful might happen 3   VIDA-7 Total Score 11     Suicide Assessment Five-step Evaluation and Treatment (SAFE-T)      How many servings of fruits and vegetables do you eat daily?  0-1    On  average, how many sweetened beverages do you drink each day (Examples: soda, juice, sweet tea, etc.  Do NOT count diet or artificially sweetened beverages)?   3    How many days per week do you exercise enough to make your heart beat faster? 3 or less    How many minutes a day do you exercise enough to make your heart beat faster? 9 or less  How many days per week do you miss taking your medication? 1    What makes it hard for you to take your medications?  remembering to take    Review of Systems   See HPI       Objective    /70 (BP Location: Right arm, Patient Position: Sitting, Cuff Size: Adult Large)   Pulse 94   Temp 98.6  F (37  C) (Tympanic)   Resp 20   Wt 95.3 kg (210 lb 3.2 oz)   BMI 30.16 kg/m    Body mass index is 30.16 kg/m .  Physical Exam   Constitutional: healthy, alert, and no distress  Head: Normocephalic. Atraumatic  Eyes: No conjunctival injection, sclera anicteric  Neck: There is tenderness along the bilateral C-spine paraspinal muscles.  No midline tenderness.  Bilateral trapezius muscle tenderness and upper back tenderness.  Palpable spasm over the trapezius as well.  Full range of motion of the neck.  Respiratory: No resp distress.  Musculoskeletal: extremities normal- no gross deformities noted, and normal muscle tone  Neurologic: Gait normal. CN 2-12 grossly intact  Psychiatric: mentation appears normal and affect normal/bright

## 2022-01-14 ENCOUNTER — HOSPITAL ENCOUNTER (OUTPATIENT)
Dept: CARDIOLOGY | Facility: CLINIC | Age: 27
Discharge: HOME | End: 2022-01-14
Attending: EMERGENCY MEDICINE | Admitting: EMERGENCY MEDICINE
Payer: COMMERCIAL

## 2022-01-14 DIAGNOSIS — R07.9 CHEST PAIN, UNSPECIFIED TYPE: ICD-10-CM

## 2022-01-14 PROCEDURE — 93242 EXT ECG>48HR<7D RECORDING: CPT

## 2022-01-14 PROCEDURE — 93248 EXT ECG>7D<15D REV&INTERPJ: CPT | Performed by: INTERNAL MEDICINE

## 2022-01-14 ASSESSMENT — ASTHMA QUESTIONNAIRES: ACT_TOTALSCORE: 17

## 2022-01-14 ASSESSMENT — ANXIETY QUESTIONNAIRES: GAD7 TOTAL SCORE: 11

## 2022-01-25 ENCOUNTER — MYC MEDICAL ADVICE (OUTPATIENT)
Dept: FAMILY MEDICINE | Facility: CLINIC | Age: 27
End: 2022-01-25
Payer: COMMERCIAL

## 2022-01-25 DIAGNOSIS — F41.0 PANIC ATTACK: Primary | ICD-10-CM

## 2022-01-25 RX ORDER — LORAZEPAM 0.5 MG/1
0.5 TABLET ORAL EVERY 8 HOURS PRN
Qty: 20 TABLET | Refills: 0 | Status: SHIPPED | OUTPATIENT
Start: 2022-01-25 | End: 2022-01-31

## 2022-01-25 NOTE — TELEPHONE ENCOUNTER
Rx for 20 tabs of Ativan for anxiety to the pharmacy for him. If symptoms do not improve with this, then he should be seen at Urgent Care.     Xander Walker PA-C

## 2022-01-25 NOTE — TELEPHONE ENCOUNTER
"Pt called re: MyChart message. Reports persistent, slightly worse  \"muscle pain/ tightness\" behind ear rates 6/10. States Flexeril prescribed 01-13-22 not helping with pain/ tightness nor sleep/ anxiety. Also has tried Salonpas, CBD Pain Freeze- ineffective.   Not sleeping well due to panic, pain. See 01-22-22 [facility] visit. Wellbutrin d/c'd. Effexor increased to 225mg daily.   Forwarded to [facility] for review- able to to VV today or advise return to UC?  SYLVESTER Omer RN    "

## 2022-01-31 RX ORDER — LORAZEPAM 1 MG/1
1 TABLET ORAL EVERY 8 HOURS PRN
Qty: 20 TABLET | Refills: 0 | Status: SHIPPED | OUTPATIENT
Start: 2022-01-31

## 2022-01-31 NOTE — TELEPHONE ENCOUNTER
Xander Walker,    Please see my chart, how do you advise on possible dose increase for the Ativan?    SUN [place]

## 2022-02-15 ENCOUNTER — TELEPHONE (OUTPATIENT)
Dept: FAMILY MEDICINE | Facility: CLINIC | Age: 27
End: 2022-02-15
Payer: COMMERCIAL

## 2022-02-15 NOTE — TELEPHONE ENCOUNTER
Patient Quality Outreach    Patient is due for the following:   Asthma  -  ACT needed    NEXT STEPS:   Patient has upcoming appointment, these items will be addressed at that time.    Type of outreach:    Chart review performed, no outreach needed.      Questions for provider review:    None     Karen Francois CMA

## 2022-03-04 ENCOUNTER — OFFICE VISIT (OUTPATIENT)
Dept: FAMILY MEDICINE | Facility: CLINIC | Age: 27
End: 2022-03-04
Payer: COMMERCIAL

## 2022-03-04 VITALS
HEART RATE: 84 BPM | SYSTOLIC BLOOD PRESSURE: 132 MMHG | OXYGEN SATURATION: 99 % | TEMPERATURE: 97.8 F | HEIGHT: 70 IN | DIASTOLIC BLOOD PRESSURE: 86 MMHG | RESPIRATION RATE: 18 BRPM | BODY MASS INDEX: 31.35 KG/M2 | WEIGHT: 219 LBS

## 2022-03-04 DIAGNOSIS — F32.1 CURRENT MODERATE EPISODE OF MAJOR DEPRESSIVE DISORDER WITHOUT PRIOR EPISODE (H): ICD-10-CM

## 2022-03-04 DIAGNOSIS — R10.10 UPPER ABDOMINAL PAIN: Primary | ICD-10-CM

## 2022-03-04 DIAGNOSIS — J45.20 MILD INTERMITTENT ASTHMA WITHOUT COMPLICATION: ICD-10-CM

## 2022-03-04 PROCEDURE — 99214 OFFICE O/P EST MOD 30 MIN: CPT | Performed by: FAMILY MEDICINE

## 2022-03-04 RX ORDER — BUSPIRONE HYDROCHLORIDE 10 MG/1
10 TABLET ORAL 2 TIMES DAILY
Qty: 60 TABLET | Refills: 1 | Status: SHIPPED | OUTPATIENT
Start: 2022-03-04 | End: 2022-05-23

## 2022-03-04 ASSESSMENT — PATIENT HEALTH QUESTIONNAIRE - PHQ9
SUM OF ALL RESPONSES TO PHQ QUESTIONS 1-9: 14
SUM OF ALL RESPONSES TO PHQ QUESTIONS 1-9: 14
10. IF YOU CHECKED OFF ANY PROBLEMS, HOW DIFFICULT HAVE THESE PROBLEMS MADE IT FOR YOU TO DO YOUR WORK, TAKE CARE OF THINGS AT HOME, OR GET ALONG WITH OTHER PEOPLE: VERY DIFFICULT

## 2022-03-04 ASSESSMENT — ANXIETY QUESTIONNAIRES
2. NOT BEING ABLE TO STOP OR CONTROL WORRYING: MORE THAN HALF THE DAYS
5. BEING SO RESTLESS THAT IT IS HARD TO SIT STILL: SEVERAL DAYS
7. FEELING AFRAID AS IF SOMETHING AWFUL MIGHT HAPPEN: MORE THAN HALF THE DAYS
1. FEELING NERVOUS, ANXIOUS, OR ON EDGE: MORE THAN HALF THE DAYS
GAD7 TOTAL SCORE: 13
7. FEELING AFRAID AS IF SOMETHING AWFUL MIGHT HAPPEN: MORE THAN HALF THE DAYS
3. WORRYING TOO MUCH ABOUT DIFFERENT THINGS: MORE THAN HALF THE DAYS
6. BECOMING EASILY ANNOYED OR IRRITABLE: SEVERAL DAYS
GAD7 TOTAL SCORE: 13
GAD7 TOTAL SCORE: 13
4. TROUBLE RELAXING: NEARLY EVERY DAY

## 2022-03-04 ASSESSMENT — ASTHMA QUESTIONNAIRES
QUESTION_1 LAST FOUR WEEKS HOW MUCH OF THE TIME DID YOUR ASTHMA KEEP YOU FROM GETTING AS MUCH DONE AT WORK, SCHOOL OR AT HOME: A LITTLE OF THE TIME
QUESTION_2 LAST FOUR WEEKS HOW OFTEN HAVE YOU HAD SHORTNESS OF BREATH: ONCE OR TWICE A WEEK
QUESTION_4 LAST FOUR WEEKS HOW OFTEN HAVE YOU USED YOUR RESCUE INHALER OR NEBULIZER MEDICATION (SUCH AS ALBUTEROL): ONCE A WEEK OR LESS
QUESTION_3 LAST FOUR WEEKS HOW OFTEN DID YOUR ASTHMA SYMPTOMS (WHEEZING, COUGHING, SHORTNESS OF BREATH, CHEST TIGHTNESS OR PAIN) WAKE YOU UP AT NIGHT OR EARLIER THAN USUAL IN THE MORNING: NOT AT ALL
ACT_TOTALSCORE: 20
QUESTION_5 LAST FOUR WEEKS HOW WOULD YOU RATE YOUR ASTHMA CONTROL: SOMEWHAT CONTROLLED
ACT_TOTALSCORE: 20

## 2022-03-04 ASSESSMENT — PAIN SCALES - GENERAL: PAINLEVEL: SEVERE PAIN (7)

## 2022-03-04 NOTE — NURSING NOTE
"Chief Complaint   Patient presents with     Asthma     Depression     Musculoskeletal Problem     /86 (Cuff Size: Adult Regular)   Pulse 84   Temp 97.8  F (36.6  C) (Tympanic)   Resp 18   Ht 1.778 m (5' 10\")   Wt 99.3 kg (219 lb)   SpO2 99%   BMI 31.42 kg/m   Estimated body mass index is 31.42 kg/m  as calculated from the following:    Height as of this encounter: 1.778 m (5' 10\").    Weight as of this encounter: 99.3 kg (219 lb).  Patient presents to the clinic using No DME      Health Maintenance that is potentially due pending provider review:    Health Maintenance Due   Topic Date Due     ANNUAL REVIEW OF HM ORDERS  Never done     ADVANCE CARE PLANNING  Never done     DEPRESSION ACTION PLAN  Never done     COVID-19 Vaccine (1) Never done     Pneumococcal Vaccine: Pediatrics (0 to 5 Years) and At-Risk Patients (6 to 64 Years) (1 of 2 - PPSV23) Never done     HPV IMMUNIZATION (1 - Male 2-dose series) Never done     HIV SCREENING  Never done     HEPATITIS C SCREENING  Never done     PREVENTIVE CARE VISIT  06/06/2018     DTAP/TDAP/TD IMMUNIZATION (4 - Td or Tdap) 09/10/2018     INFLUENZA VACCINE (1) Never done     ASTHMA ACTION PLAN  01/20/2022                "

## 2022-03-04 NOTE — PROGRESS NOTES
"  Assessment & Plan     Upper abdominal pain  Differential discussed in detail including vascular etiology, gastroesophageal reflux disease.  Patient has tried omeprazole without any significant effect.  Recommended well hydration, healthy diet and GI referral placed  - Adult Gastro Ref - Consult Only; Future    Current moderate episode of major depressive disorder without prior episode (H)  Known to have depression with anxiety, symptoms poorly controlled especially anxiety.  BuSpar prescribed and suggested to continue venlafaxine, lorazepam  - [facility]  Referral; Future  - busPIRone (BUSPAR) 10 MG tablet; Take 1 tablet (10 mg) by mouth 2 times daily  - [facility]  Referral; Future    Mild intermittent asthma without complication  Allergist referral placed  - Adult Allergy/Asthma Referral; Future    Patient, mother understood and in agreement with above plan.  All questions answered.     BMI:   Estimated body mass index is 31.42 kg/m  as calculated from the following:    Height as of this encounter: 1.778 m (5' 10\").    Weight as of this encounter: 99.3 kg (219 lb).   Weight management plan: Discussed healthy diet and exercise guidelines    Depression Screening Follow Up    PHQ 3/4/2022   PHQ-9 Total Score 14   Q9: Thoughts of better off dead/self-harm past 2 weeks Several days   F/U: Thoughts of suicide or self-harm No   F/U: Safety concerns No       Julian Decker MD  [facility] is a 27 year old who presents for the following health issues     History of Present Illness     Asthma:  He presents for follow up of asthma.  He has some cough, some wheezing, and some shortness of breath. He is using a relief medication a few times a month. He does not have a controller medication. Patient is aware of the following triggers: gastric reflux and upper respiratory infections. The patient has not had a visit to the Emergency Room, " Urgent Care or Hospital due to asthma since the last clinic visit. He has been to the Emergency Room or Urgent Care 0 times.He has had a Hospitalization 0 times.    Back Pain:  He presents for follow up of back pain. Patient's back pain is a recurring problem.  Location of back pain:  Other (wraps around the torso- under the rib cage area. Radiates from the front around to the back. )  Description of back pain: burning and sharp  Back pain spreads: nowhere    Since last visit, how has back pain changed: always present, but gets better and worse  Since patient first noticed back pain, pain is: always present, but gets better and worse  Does back pain interfere with his job:  Yes  Has the patient tried any new treatments:  Yes If Yes, which: heat, cold, Chiropractor and rest      Mental Health Follow-up:  Patient presents to follow-up on Depression & Anxiety.Patient's depression since last visit has been:  Worse  The patient is having other symptoms associated with depression.  Patient's anxiety since last visit has been:  Worse  The patient is having other symptoms associated with anxiety.  Any significant life events: No  Patient is feeling anxious or having panic attacks.  Patient has no concerns about alcohol or drug use.     Social History  Tobacco Use    Smoking status: Current Every Day Smoker      Packs/day: 0.50      Years: 5.00      Pack years: 2.5      Types: Cigarettes    Smokeless tobacco: Never Used  Alcohol use: No  Drug use: No      Today's PHQ-9         PHQ-9 Total Score:     (P) 14   PHQ-9 Q9 Thoughts of better off dead/self-harm past 2 weeks :   (P) Several days   Thoughts of suicide or self harm:  (P) No   Self-harm Plan:        Self-harm Action:          Safety concerns for self or others: (P) No         Migraines:   Since the patient's last clinic visit, headaches are: no change  The patient is getting headaches:  Not to much  He is able to do normal daily activities when he has a migraine.  The  "patient is taking the following rescue/relief medications:  Other   Patient states \"I get no relief\" from the rescue/relief medications.   The patient is taking the following medications to prevent migraines:  Other  In the past 4 weeks, the patient has gone to an Urgent Care or Emergency Room 0 times times due to headaches.  Reason for visit:  Diaphragm pain  Symptom onset:  More than a month  Symptoms include:  Diaphragm pain,burning hurts when I cough and slouch and tightness  Symptom intensity:  Moderate  Symptom progression:  Improving  Had these symptoms before:  No  What makes it worse:  Slouching coughing sneezing bending over breathing  What makes it better:  Fluids and trying to be positive    He eats 2-3 servings of fruits and vegetables daily.He consumes 2 sweetened beverage(s) daily.He exercises with enough effort to increase his heart rate 9 or less minutes per day.  He exercises with enough effort to increase his heart rate 4 days per week. He is missing 1 dose(s) of medications per week.         Review of Systems   Constitutional, HEENT, cardiovascular, pulmonary, GI, , musculoskeletal, neuro, skin, endocrine and psych systems are negative, except as otherwise noted.      Objective    /86 (Cuff Size: Adult Regular)   Pulse 84   Temp 97.8  F (36.6  C) (Tympanic)   Resp 18   Ht 1.778 m (5' 10\")   Wt 99.3 kg (219 lb)   SpO2 99%   BMI 31.42 kg/m    Body mass index is 31.42 kg/m .  Physical Exam   GENERAL: alert, no distress and obese  EYES: Eyes grossly normal to inspection, PERRL and conjunctivae and sclerae normal  HENT: normal cephalic/atraumatic, nose and mouth without ulcers or lesions, oropharynx clear and oral mucous membranes moist  NECK: no adenopathy, no asymmetry, masses, or scars and thyroid normal to palpation  RESP: lungs clear to auscultation - no rales, rhonchi or wheezes  CV: regular rate and rhythm, normal S1 S2, no S3 or S4, no murmur, click or rub, no peripheral edema " and peripheral pulses strong  ABDOMEN: soft, nontender, no hepatosplenomegaly, no masses and bowel sounds normal  MS: no gross musculoskeletal defects noted, no edema  SKIN: no suspicious lesions or rashes  NEURO: Grossly intact  PSYCH: depressed/anxious, judgement and insight intact, appearance well groomed

## 2022-03-05 ASSESSMENT — ANXIETY QUESTIONNAIRES: GAD7 TOTAL SCORE: 13

## 2022-03-17 ENCOUNTER — OFFICE VISIT (OUTPATIENT)
Dept: URGENT CARE | Facility: URGENT CARE | Age: 27
End: 2022-03-17
Payer: COMMERCIAL

## 2022-03-17 VITALS
WEIGHT: 224 LBS | BODY MASS INDEX: 32.14 KG/M2 | TEMPERATURE: 98.6 F | OXYGEN SATURATION: 96 % | HEART RATE: 95 BPM | DIASTOLIC BLOOD PRESSURE: 84 MMHG | SYSTOLIC BLOOD PRESSURE: 128 MMHG

## 2022-03-17 DIAGNOSIS — L30.9 DERMATITIS: ICD-10-CM

## 2022-03-17 DIAGNOSIS — B35.9 RINGWORM: Primary | ICD-10-CM

## 2022-03-17 PROCEDURE — 99213 OFFICE O/P EST LOW 20 MIN: CPT | Performed by: PHYSICIAN ASSISTANT

## 2022-03-17 RX ORDER — CLOTRIMAZOLE 1 %
CREAM (GRAM) TOPICAL 2 TIMES DAILY
Qty: 30 G | Refills: 0 | Status: SHIPPED | OUTPATIENT
Start: 2022-03-17

## 2022-03-17 RX ORDER — TRIAMCINOLONE ACETONIDE 1 MG/G
OINTMENT TOPICAL 2 TIMES DAILY
Qty: 30 G | Refills: 0 | Status: SHIPPED | OUTPATIENT
Start: 2022-03-17

## 2022-03-17 ASSESSMENT — ENCOUNTER SYMPTOMS
NEUROLOGICAL NEGATIVE: 1
CONSTITUTIONAL NEGATIVE: 1

## 2022-03-17 NOTE — PROGRESS NOTES
Assessment & Plan     Ringworm  - clotrimazole (LOTRIMIN) 1 % external cream  Dispense: 30 g; Refill: 0    Dermatitis  - triamcinolone (KENALOG) 0.1 % external ointment  Dispense: 30 g; Refill: 0     Use ointment as directed. Monitor for new or worsening symptoms.     Return in about 1 week (around 3/24/2022), or if symptoms worsen or fail to improve, for Follow up.      Subjective     James is a 27 year old male who presents to clinic today for the following health issues:  Chief Complaint   Patient presents with     Derm Problem     started over 1 wk ago, rash is on buttocks, front lower stomach and back of legs.     James presents with reports of rash on thighs and buttocks x 1 week. He has not tried any medicine at this time. He does report new soap that is Hemp. He has tried hydrocortisone which offered some relief. He denies fevers, discharge from the wounds.               Review of Systems   Constitutional: Negative.    Skin: Positive for rash.   Neurological: Negative.            Objective    /84   Pulse 95   Temp 98.6  F (37  C) (Tympanic)   Wt 101.6 kg (224 lb)   SpO2 96%   BMI 32.14 kg/m    Physical Exam  Constitutional:       Appearance: Normal appearance.   HENT:      Head: Normocephalic and atraumatic.   Musculoskeletal:      Cervical back: Normal range of motion and neck supple.   Skin:     Findings: Rash present. Rash is macular and papular.             Comments: Leg shows two circumfrential lesions with scaling, under stomach maculopapuler and on right leg maculopapular   Neurological:      General: No focal deficit present.      Mental Status: He is alert and oriented to person, place, and time.              Aramis Conway PA-C

## 2022-03-17 NOTE — PATIENT INSTRUCTIONS
Patient Education     Understanding Tinea Cruris  Tinea cruris is a type of fungal infection. The fungus infects the skin in the groin. It is often called jock itch. Men are more prone to it than women.   How to say it  TIN-ee-a CROO-ris  What causes tinea cruris?  Tinea cruris occurs when certain types of fungus grow in the groin area. The infection might spread from the feet (tinea pedis or athlete's foot) to the groin. Or it can be in the groin alone. The infection can also be passed from person to person. You can get it if you touch an infected surface, such as a towel or bench in a locker room. It is more common if you have a lot of heat, sweating, or friction in the groin, or if you are overweight.  What are the symptoms of tinea cruris?  Symptoms include:    Red patches along the upper thigh, butt, and groin. The patches have a well-marked border, and sometimes a clear central area.    Itchiness    Scaling skin along the border of the rash    Red bumps (pustules and papules)    Burning or stinging sensation  How is tinea cruris treated?  With proper treatment, tinea cruris will go away in 2 to 3 weeks. Treatments include:    Over-the-counter or prescription antifungal creams or powders.  You can put these on the skin. They kill the fungus and ease symptoms.    Antifungal medicines by mouth.  You may need to take a prescription antifungal medicine by mouth if the infection is widespread or isn't responding to the OTC cream or powder.    Good hygiene. Keep the groin area clean and dry. Take a bath or shower as soon as possible after physical activity. Don t wear sweaty clothes for an extended time. Wear loose cotton underwear. Drying powders may be helpful if you sweat a lot.  What are possible complications of tinea cruris?  Possible complications include:    Repeated fungal infections    Bacterial infection of the affected skin  When should I call my healthcare provider?  Call your healthcare provider right  away if you have any of these:    Pain that gets worse    Symptoms that don t get better, or get worse    New symptoms  [employer] last reviewed this educational content on 11/1/2019 2000-2021 [employer]. All rights reserved. This information is not intended as a substitute for professional medical care. Always follow your healthcare professional's instructions.           Patient Education     What Is Atopic Dermatitis?  Atopic dermatitis (eczema) causes chronic skin irritation. This condition can affect people of all ages. It often runs in families (is genetic). It may also be linked to allergies such as hay fever and sometimes asthma. Patches of skin become dry, red, itchy, and scaly. In people with abnormally dry skin it's often called xerosis. Sometimes atopic dermatitis is only on the hands or feet. It often improves when the skin is well hydrated. It gets worse when the skin is dry. You can help control symptoms by practicing good self-care. Stay away from anything that causes flare-ups such as sunburn or vigorous scratching.   Where do you have symptoms?  Atopic dermatitis symptoms can appear anywhere on the body. But in most cases, they vary based on the person s age. In babies, irritation is often seen on the scalp, cheeks, chin, near the mouth, and under the eyelids. In children ages 2 through 10, skin folds such as the backs of the knees or in the arm crease are most often affected. In children 11 and older and in adults, symptoms can affect many areas.   What triggers symptoms?  Symptoms flare because of many things. These include skin dryness, scratching, stress, harsh soaps, and irritants such as dust or wool. Try to stay away from anything that causes flare-ups.   Recognizing what causes flare-ups  To figure out what causes atopic dermatitis to flare, keep a list of things that seem to affect your skin. Start by filling in the spaces below. Then keep writing them down in a notebook or  diary. The things that affect each person vary. So keep your own list and try to stay away from your triggers.     [email or web address] last reviewed this educational content on 8/1/2019 2000-2021 [employer]. All rights reserved. This information is not intended as a substitute for professional medical care. Always follow your healthcare professional's instructions.

## 2022-03-21 DIAGNOSIS — F32.1 CURRENT MODERATE EPISODE OF MAJOR DEPRESSIVE DISORDER WITHOUT PRIOR EPISODE (H): ICD-10-CM

## 2022-03-21 RX ORDER — VENLAFAXINE HYDROCHLORIDE 75 MG/1
150 CAPSULE, EXTENDED RELEASE ORAL DAILY
Qty: 180 CAPSULE | Refills: 1 | Status: CANCELLED | OUTPATIENT
Start: 2022-03-21

## 2022-03-21 NOTE — TELEPHONE ENCOUNTER
Reason for Call:  Medication or medication refill:  velafaxine 75mg 24hr  Needs 2 days worth of pills   Has virtual appt scheduled for 3/22@1:45  Do you use a [facility]?  Name of the pharmacy and phone number for the current request:  [facility] in [place]  Name of the medication requested: venlafaxine 75MG    Other request: Belén Webb his Guardian is calling for him. If virtual visit doesn't work, they will reschedule.    Can we leave a detailed message on this number? YES  James has a lot of anxiety   He went to ER and the ER doc upped his meds 3 pills. That is why he is short his meds at this time   Phone number patient can be reached at: Home number on file [phone] (home)    Best Time: any    Call taken on 3/21/2022 at 5:00 PM by Yessica Weber

## 2022-03-21 NOTE — TELEPHONE ENCOUNTER
Reason for Call:  Medication or medication refill:venlafaxine 75mg    Do you use a [facility]?  Name of the pharmacy and phone number for the currentrequest:  [facility] in [place]   Name of the medication requested: [id number] ASAP PLEASE HE NEEDS 2 PILLS TO GET TO HIS APPT ON WED    Other request: JUST NEED 2 EXTRA PILLS TO GET TO WED PLEASE    Can we leave a detailed message on this number? YES    Phone number patient can be reached at: Home number on file [phone] (home)    Best Time: ANY    Call taken on 3/21/2022 at 5:07 PM by Yessica Weber

## 2022-03-22 ENCOUNTER — VIRTUAL VISIT (OUTPATIENT)
Dept: FAMILY MEDICINE | Facility: CLINIC | Age: 27
End: 2022-03-22
Payer: COMMERCIAL

## 2022-03-22 DIAGNOSIS — F32.1 CURRENT MODERATE EPISODE OF MAJOR DEPRESSIVE DISORDER WITHOUT PRIOR EPISODE (H): ICD-10-CM

## 2022-03-22 PROCEDURE — 99214 OFFICE O/P EST MOD 30 MIN: CPT | Mod: 95 | Performed by: PHYSICIAN ASSISTANT

## 2022-03-22 RX ORDER — VENLAFAXINE HYDROCHLORIDE 75 MG/1
225 CAPSULE, EXTENDED RELEASE ORAL DAILY
Qty: 270 CAPSULE | Refills: 3 | Status: SHIPPED | OUTPATIENT
Start: 2022-03-22 | End: 2022-06-20

## 2022-03-22 ASSESSMENT — ANXIETY QUESTIONNAIRES
5. BEING SO RESTLESS THAT IT IS HARD TO SIT STILL: SEVERAL DAYS
2. NOT BEING ABLE TO STOP OR CONTROL WORRYING: SEVERAL DAYS
3. WORRYING TOO MUCH ABOUT DIFFERENT THINGS: MORE THAN HALF THE DAYS
7. FEELING AFRAID AS IF SOMETHING AWFUL MIGHT HAPPEN: NEARLY EVERY DAY
GAD7 TOTAL SCORE: 13
1. FEELING NERVOUS, ANXIOUS, OR ON EDGE: NEARLY EVERY DAY
6. BECOMING EASILY ANNOYED OR IRRITABLE: SEVERAL DAYS

## 2022-03-22 ASSESSMENT — PATIENT HEALTH QUESTIONNAIRE - PHQ9
5. POOR APPETITE OR OVEREATING: MORE THAN HALF THE DAYS
SUM OF ALL RESPONSES TO PHQ QUESTIONS 1-9: 14

## 2022-03-22 NOTE — PROGRESS NOTES
James is a 27 year old who is being evaluated via a billable video visit.      How would you like to obtain your AVS? MyChart  If the video visit is dropped, the invitation should be resent by: Text to cell phone: [phone]  Will anyone else be joining your video visit? No    Video Start Time: 2:07 PM    Assessment & Plan   Current moderate episode of major depressive disorder without prior episode (H)  Ran out of medication due to dose increase. Symptoms are improved. Passive SI. No plan. Not taking Ativan as much, does not need a refill of that. Refilled Effexor 3 caps daily. Follow-up in 3 months for recheck.   - venlafaxine (EFFEXOR-XR) 75 MG 24 hr capsule; Take 3 capsules (225 mg) by mouth daily     Depression Screening Follow Up    PHQ 3/22/2022   PHQ-9 Total Score 14   Q9: Thoughts of better off dead/self-harm past 2 weeks Several days   F/U: Thoughts of suicide or self-harm -   F/U: Safety concerns -       Follow Up      Follow Up Actions Taken  Crisis resource information provided in the After Visit Summary    Discussed the following ways the patient can remain in a safe environment:  be around others    Return in about 3 months (around 6/22/2022), or if symptoms worsen or fail to improve, for Anxiety check.    Xander Walker PA-C  [facility]   James is a 27 year old who presents for the following health issues     HPI     ED/UC Followup:    Facility:  [facility]   Date of visit: 03/02/2022  Reason for visit: Chest pain   Current Status: Patient states that he is still having the chest pain at times but this is improved. Anxiety is improving. Increased to 3 tabs of Effexor. Still having headaches, and neck pain but again improving overall. Has not been using Ativan as much anymore.      Review of Systems   See HPI      Objective       Vitals:  No vitals were obtained today due to virtual visit.    Physical Exam   GENERAL: Healthy, alert  and no distress  EYES: Eyes grossly normal to inspection.  No discharge or erythema, or obvious scleral/conjunctival abnormalities.  RESP: No audible wheeze, cough, or visible cyanosis.  No visible retractions or increased work of breathing.    SKIN: Visible skin clear. No significant rash, abnormal pigmentation or lesions.  NEURO: Cranial nerves grossly intact.  Mentation and speech appropriate for age.  PSYCH: Mentation appears normal, affect normal/bright, judgement and insight intact, normal speech and appearance well-groomed.          Video-Visit Details    Type of service:  Video Visit    Video End Time:2:16 PM    Originating Location (pt. Location): Home    Distant Location (provider location):  [facility]     Platform used for Video Visit: Gudog

## 2022-03-23 ENCOUNTER — TELEPHONE (OUTPATIENT)
Dept: BEHAVIORAL HEALTH | Facility: CLINIC | Age: 27
End: 2022-03-23
Payer: COMMERCIAL

## 2022-03-23 ASSESSMENT — ANXIETY QUESTIONNAIRES: GAD7 TOTAL SCORE: 13

## 2022-03-23 NOTE — TELEPHONE ENCOUNTER
This patient was placed in [facility]) schedule by  Stefani Sherman. This RN does not see a referral for the TC. This patient may have been referred to long term care psychiatry and accidentally placed into the TC schedule.   Please call to schedule this patient.   Thanks  Regina Rodas RN on 3/23/2022 at 3:06 PM

## 2022-03-23 NOTE — TELEPHONE ENCOUNTER
Multiple attempts made to connect with the pt for the 10:30 appt with BHARAT Agee but he did not answer. LVM asking to call TC clinic and r/c .

## 2022-03-23 NOTE — TELEPHONE ENCOUNTER
Writer spoke with pt on que call. Pt stated he missed his 10:30 am appointment for med management and was hoping to reschedule. Writer will route to TC RN pool for a call back to pt.    Claribel Grant  03/23/22  214

## 2022-03-23 NOTE — TELEPHONE ENCOUNTER
"RN gave [facility] phone number to schedule with long term care provider. Patient agreed.    RN spoke with patient who stated   \"patient is terrified and I feel like I am constantly having chest pains\"   RN stated to immediately go to the ER.  Patient stated that he has been to the ER and has been having MRI's, EKG's and blood work.     Reports that he is constantly \"under stress and that I am in the nightmare that I was before my teeth got pulled out\"  Patient was sporadic on topics when speaking. Patient wanting to elaborate on life.     Patient reports that he used to do \"drugs\" would not disclose a type. He reports that he has been off them for over 3 years. He believes that there is something wrong with him. He reports using alcohol currently to \"make himself forget\" Patient stated \"I am terrified\"   Pt reports \"constant racing thoughts\"selin    RN asked if he has reported any of this information to providers/medical doctors. Patient stated he has reported to his PCP but they just ran tests.     Patient denies suicide. \"I am not going to do it but I am really sick of life right now\" Denies plan but spoke about movies and would know how to carry out a plan.     RN reported that he should go to local ER, 911 or [facility] unit.     RN urged Empath with what the patient is reporting.     Patient reports that he is going to ask his mother, reported that she is his guardian, and will think about going to [facility].   "

## 2022-03-28 NOTE — PATIENT INSTRUCTIONS
Continue Effexor and Buspar. Continue counseling for anxiety.     Suicide Resource Information:   Contact a local mental health clinic or the following:     National Suicide Prevention Hjsjkhns720-742-NTNA (622-800-9651) www.suicidepreventionlifeline.org    National [place] of Mental Mhaols547-997-0089adl.[facility].nih.gov    National [place] on Mental Iylirol901-151-0497rmu.mago.org    Mental Health [place] 014-655-5456apd.[facility].org    National Suicide Yesyooa763-OKAZEOO (146-898-5248)  Take action. Remove means to self-harm, such as guns, rope, or stockpiled pills.   If there is an immediate risk, call 911.   In less critical situations, call the 24-hour suicide crisis hotline 765-346-QALF (193-790-0938). If the person can be driven somewhere safely, take the person to the closest hospital ER.

## 2022-05-20 DIAGNOSIS — F32.1 CURRENT MODERATE EPISODE OF MAJOR DEPRESSIVE DISORDER WITHOUT PRIOR EPISODE (H): ICD-10-CM

## 2022-05-23 RX ORDER — BUSPIRONE HYDROCHLORIDE 10 MG/1
TABLET ORAL
Qty: 60 TABLET | Refills: 0 | Status: SHIPPED | OUTPATIENT
Start: 2022-05-23

## 2022-06-13 ENCOUNTER — TRANSFERRED RECORDS (OUTPATIENT)
Dept: FAMILY MEDICINE | Facility: CLINIC | Age: 27
End: 2022-06-13

## 2022-07-07 DIAGNOSIS — R07.89 CHEST WALL PAIN: ICD-10-CM

## 2022-07-07 NOTE — TELEPHONE ENCOUNTER
"Requested Prescriptions   Pending Prescriptions Disp Refills     naproxen (NAPROSYN) 500 MG tablet [Pharmacy Med Name: Naproxen 500MG TABS] 56 tablet      Sig: TAKE 1 TABLET BY MOUTH TWICE A DAY       NSAID Medications Failed - 7/7/2022  6:55 AM        Failed - Normal ALT on file in past 12 months     Recent Labs   Lab Test 05/08/20  1251   ALT 35             Failed - Normal AST on file in past 12 months     Recent Labs   Lab Test 05/08/20  1251   AST 19             Passed - Blood pressure under 140/90 in past 12 months     BP Readings from Last 3 Encounters:   03/17/22 128/84   03/04/22 132/86   01/13/22 126/70                 Passed - Recent (12 mo) or future (30 days) visit within the authorizing provider's specialty     Patient has had an office visit with the authorizing provider or a provider within the authorizing providers department within the previous 12 mos or has a future within next 30 days. See \"Patient Info\" tab in inbasket, or \"Choose Columns\" in Meds & Orders section of the refill encounter.              Passed - Patient is age 6-64 years        Passed - Normal CBC on file in past 12 months     Recent Labs   Lab Test 12/21/21  1342   WBC 8.6   RBC 5.45   HGB 15.3   HCT 45.2                    Passed - Medication is active on med list        Passed - Normal serum creatinine on file in past 12 months     Recent Labs   Lab Test 12/21/21  1342   CR 0.69       Ok to refill medication if creatinine is low             Last Written Prescription Date: 4/7/21   Last Fill Quantity: 60,  # refills: 2   Last office visit: 3/4/2022 with prescribing provider:     Future Office Visit:            "

## 2022-07-11 RX ORDER — NAPROXEN 500 MG/1
TABLET ORAL
Qty: 56 TABLET | OUTPATIENT
Start: 2022-07-11

## 2022-08-08 ENCOUNTER — TRANSFERRED RECORDS (OUTPATIENT)
Dept: FAMILY MEDICINE | Facility: CLINIC | Age: 27
End: 2022-08-08

## 2022-10-12 ENCOUNTER — TRANSFERRED RECORDS (OUTPATIENT)
Dept: FAMILY MEDICINE | Facility: CLINIC | Age: 27
End: 2022-10-12

## 2022-11-14 ENCOUNTER — TRANSFERRED RECORDS (OUTPATIENT)
Dept: HEALTH INFORMATION MANAGEMENT | Facility: CLINIC | Age: 27
End: 2022-11-14

## 2022-12-12 ENCOUNTER — TELEPHONE (OUTPATIENT)
Dept: FAMILY MEDICINE | Facility: CLINIC | Age: 27
End: 2022-12-12

## 2022-12-12 NOTE — TELEPHONE ENCOUNTER
Patient Quality Outreach    Patient is due for the following:   Asthma  -  ACT needed and AAP  Depression  -  PHQ-9 needed  Physical Preventive Adult Physical      Topic Date Due     COVID-19 Vaccine (1) Never done     Pneumococcal Vaccine (1 - PCV) Never done     HPV Vaccine (1 - Male 2-dose series) Never done     Diptheria Tetanus Pertussis (DTAP/TDAP/TD) Vaccine (4 - Td or Tdap) 09/10/2018     Flu Vaccine (1) Never done       Next Steps:   Schedule a Adult Preventative    Type of outreach:    Sent [employer] message.      Questions for provider review:    None     Bailee Kahler

## 2022-12-19 ENCOUNTER — TRANSFERRED RECORDS (OUTPATIENT)
Dept: HEALTH INFORMATION MANAGEMENT | Facility: CLINIC | Age: 27
End: 2022-12-19

## 2022-12-27 ENCOUNTER — TRANSFERRED RECORDS (OUTPATIENT)
Dept: HEALTH INFORMATION MANAGEMENT | Facility: CLINIC | Age: 27
End: 2022-12-27

## 2023-02-08 ENCOUNTER — TRANSFERRED RECORDS (OUTPATIENT)
Dept: HEALTH INFORMATION MANAGEMENT | Facility: CLINIC | Age: 28
End: 2023-02-08
Payer: COMMERCIAL

## 2023-05-23 ENCOUNTER — TRANSFERRED RECORDS (OUTPATIENT)
Dept: HEALTH INFORMATION MANAGEMENT | Facility: CLINIC | Age: 28
End: 2023-05-23
Payer: COMMERCIAL

## 2023-06-08 ENCOUNTER — TRANSFERRED RECORDS (OUTPATIENT)
Dept: HEALTH INFORMATION MANAGEMENT | Facility: CLINIC | Age: 28
End: 2023-06-08
Payer: COMMERCIAL

## 2023-07-06 ENCOUNTER — TRANSFERRED RECORDS (OUTPATIENT)
Dept: HEALTH INFORMATION MANAGEMENT | Facility: CLINIC | Age: 28
End: 2023-07-06
Payer: COMMERCIAL

## 2023-07-31 ENCOUNTER — TRANSFERRED RECORDS (OUTPATIENT)
Dept: HEALTH INFORMATION MANAGEMENT | Facility: CLINIC | Age: 28
End: 2023-07-31
Payer: COMMERCIAL

## 2023-11-20 ENCOUNTER — TRANSFERRED RECORDS (OUTPATIENT)
Dept: HEALTH INFORMATION MANAGEMENT | Facility: CLINIC | Age: 28
End: 2023-11-20
Payer: COMMERCIAL

## 2023-12-12 ENCOUNTER — TRANSFERRED RECORDS (OUTPATIENT)
Dept: HEALTH INFORMATION MANAGEMENT | Facility: CLINIC | Age: 28
End: 2023-12-12
Payer: COMMERCIAL

## 2024-02-13 ENCOUNTER — TRANSFERRED RECORDS (OUTPATIENT)
Dept: HEALTH INFORMATION MANAGEMENT | Facility: CLINIC | Age: 29
End: 2024-02-13
Payer: COMMERCIAL

## 2024-03-19 ENCOUNTER — TRANSFERRED RECORDS (OUTPATIENT)
Dept: HEALTH INFORMATION MANAGEMENT | Facility: CLINIC | Age: 29
End: 2024-03-19
Payer: COMMERCIAL

## 2024-06-04 ENCOUNTER — TRANSFERRED RECORDS (OUTPATIENT)
Dept: HEALTH INFORMATION MANAGEMENT | Facility: CLINIC | Age: 29
End: 2024-06-04
Payer: COMMERCIAL

## 2024-07-23 ENCOUNTER — TRANSFERRED RECORDS (OUTPATIENT)
Dept: HEALTH INFORMATION MANAGEMENT | Facility: CLINIC | Age: 29
End: 2024-07-23
Payer: COMMERCIAL

## 2024-09-03 ENCOUNTER — TRANSFERRED RECORDS (OUTPATIENT)
Dept: HEALTH INFORMATION MANAGEMENT | Facility: CLINIC | Age: 29
End: 2024-09-03
Payer: COMMERCIAL

## 2025-01-07 ENCOUNTER — TRANSFERRED RECORDS (OUTPATIENT)
Dept: HEALTH INFORMATION MANAGEMENT | Facility: CLINIC | Age: 30
End: 2025-01-07
Payer: COMMERCIAL

## 2025-06-17 ENCOUNTER — TRANSFERRED RECORDS (OUTPATIENT)
Dept: HEALTH INFORMATION MANAGEMENT | Facility: CLINIC | Age: 30
End: 2025-06-17
Payer: COMMERCIAL